# Patient Record
Sex: FEMALE | Race: BLACK OR AFRICAN AMERICAN | NOT HISPANIC OR LATINO | Employment: UNEMPLOYED | URBAN - METROPOLITAN AREA
[De-identification: names, ages, dates, MRNs, and addresses within clinical notes are randomized per-mention and may not be internally consistent; named-entity substitution may affect disease eponyms.]

---

## 2023-03-31 DIAGNOSIS — R73.9 HYPERGLYCEMIA: ICD-10-CM

## 2023-03-31 DIAGNOSIS — E55.9 VITAMIN D DEFICIENCY: ICD-10-CM

## 2023-03-31 DIAGNOSIS — E11.9 TYPE 2 DIABETES MELLITUS WITHOUT COMPLICATION, WITHOUT LONG-TERM CURRENT USE OF INSULIN (MULTI): ICD-10-CM

## 2023-03-31 DIAGNOSIS — I47.10 PAROXYSMAL SVT (SUPRAVENTRICULAR TACHYCARDIA) (CMS-HCC): ICD-10-CM

## 2023-03-31 DIAGNOSIS — Z00.00 ANNUAL PHYSICAL EXAM: ICD-10-CM

## 2023-03-31 DIAGNOSIS — E78.5 HYPERLIPIDEMIA, UNSPECIFIED HYPERLIPIDEMIA TYPE: ICD-10-CM

## 2023-03-31 DIAGNOSIS — I50.33 ACUTE ON CHRONIC DIASTOLIC CONGESTIVE HEART FAILURE (MULTI): ICD-10-CM

## 2023-03-31 RX ORDER — OMEPRAZOLE 40 MG/1
40 CAPSULE, DELAYED RELEASE ORAL 2 TIMES DAILY
COMMUNITY
End: 2023-04-05 | Stop reason: SDUPTHER

## 2023-03-31 RX ORDER — ATORVASTATIN CALCIUM 40 MG/1
40 TABLET, FILM COATED ORAL DAILY
COMMUNITY
End: 2023-04-05 | Stop reason: SDUPTHER

## 2023-03-31 RX ORDER — ACETAMINOPHEN 500 MG
50 TABLET ORAL DAILY
COMMUNITY
End: 2024-05-10 | Stop reason: SDUPTHER

## 2023-03-31 RX ORDER — METFORMIN HYDROCHLORIDE 500 MG/1
500 TABLET ORAL 2 TIMES DAILY
COMMUNITY
Start: 2021-11-12 | End: 2023-04-05 | Stop reason: SDUPTHER

## 2023-03-31 RX ORDER — BENZONATATE 200 MG/1
200 CAPSULE ORAL
COMMUNITY
Start: 2022-12-19 | End: 2023-04-05 | Stop reason: ALTCHOICE

## 2023-03-31 RX ORDER — FLUTICASONE PROPIONATE AND SALMETEROL 100; 50 UG/1; UG/1
1 POWDER RESPIRATORY (INHALATION)
COMMUNITY
End: 2023-04-05 | Stop reason: SDUPTHER

## 2023-03-31 RX ORDER — ALBUTEROL SULFATE 90 UG/1
2 AEROSOL, METERED RESPIRATORY (INHALATION) EVERY 6 HOURS
COMMUNITY
Start: 2022-04-05 | End: 2023-04-05 | Stop reason: SDUPTHER

## 2023-03-31 RX ORDER — METOPROLOL SUCCINATE 50 MG/1
50 TABLET, EXTENDED RELEASE ORAL DAILY
COMMUNITY
End: 2023-04-05 | Stop reason: SDUPTHER

## 2023-03-31 RX ORDER — PANTOPRAZOLE SODIUM 40 MG/1
40 TABLET, DELAYED RELEASE ORAL DAILY
COMMUNITY
End: 2023-04-05 | Stop reason: ALTCHOICE

## 2023-03-31 RX ORDER — FUROSEMIDE 20 MG/1
20 TABLET ORAL DAILY
COMMUNITY
Start: 2022-04-02 | End: 2023-06-22 | Stop reason: ALTCHOICE

## 2023-03-31 RX ORDER — MELOXICAM 7.5 MG/1
7.5 TABLET ORAL DAILY
COMMUNITY
End: 2024-01-11 | Stop reason: SDUPTHER

## 2023-03-31 RX ORDER — CLOPIDOGREL BISULFATE 75 MG/1
75 TABLET ORAL DAILY
COMMUNITY
End: 2023-04-05 | Stop reason: SDUPTHER

## 2023-04-05 ENCOUNTER — OFFICE VISIT (OUTPATIENT)
Dept: PRIMARY CARE | Facility: CLINIC | Age: 63
End: 2023-04-05
Payer: COMMERCIAL

## 2023-04-05 ENCOUNTER — APPOINTMENT (OUTPATIENT)
Dept: PRIMARY CARE | Facility: CLINIC | Age: 63
End: 2023-04-05
Payer: COMMERCIAL

## 2023-04-05 VITALS
HEART RATE: 88 BPM | OXYGEN SATURATION: 98 % | DIASTOLIC BLOOD PRESSURE: 88 MMHG | BODY MASS INDEX: 38.25 KG/M2 | SYSTOLIC BLOOD PRESSURE: 130 MMHG | WEIGHT: 238 LBS | RESPIRATION RATE: 12 BRPM | HEIGHT: 66 IN

## 2023-04-05 DIAGNOSIS — E11.69 TYPE 2 DIABETES MELLITUS WITH OTHER SPECIFIED COMPLICATION, UNSPECIFIED WHETHER LONG TERM INSULIN USE (MULTI): ICD-10-CM

## 2023-04-05 DIAGNOSIS — K21.9 GASTROESOPHAGEAL REFLUX DISEASE WITHOUT ESOPHAGITIS: ICD-10-CM

## 2023-04-05 DIAGNOSIS — R73.9 HYPERGLYCEMIA: ICD-10-CM

## 2023-04-05 DIAGNOSIS — Z12.31 ENCOUNTER FOR SCREENING MAMMOGRAM FOR BREAST CANCER: ICD-10-CM

## 2023-04-05 DIAGNOSIS — E55.9 VITAMIN D DEFICIENCY: ICD-10-CM

## 2023-04-05 DIAGNOSIS — J45.909 UNCOMPLICATED ASTHMA, UNSPECIFIED ASTHMA SEVERITY, UNSPECIFIED WHETHER PERSISTENT (HHS-HCC): Primary | ICD-10-CM

## 2023-04-05 DIAGNOSIS — I10 BENIGN ESSENTIAL HYPERTENSION: ICD-10-CM

## 2023-04-05 DIAGNOSIS — I50.33 ACUTE ON CHRONIC DIASTOLIC CONGESTIVE HEART FAILURE (MULTI): ICD-10-CM

## 2023-04-05 PROBLEM — M54.2 CERVICALGIA: Status: ACTIVE | Noted: 2023-04-05

## 2023-04-05 PROBLEM — F17.210 DEPENDENCE ON NICOTINE FROM CIGARETTES: Status: ACTIVE | Noted: 2023-04-05

## 2023-04-05 PROBLEM — N63.0 BREAST MASS: Status: ACTIVE | Noted: 2023-04-05

## 2023-04-05 PROBLEM — M25.569 JOINT PAIN, KNEE: Status: ACTIVE | Noted: 2023-04-05

## 2023-04-05 PROBLEM — M79.89 SWELLING OF RIGHT LOWER EXTREMITY: Status: ACTIVE | Noted: 2023-04-05

## 2023-04-05 PROBLEM — J01.10 ACUTE FRONTAL SINUSITIS: Status: ACTIVE | Noted: 2023-04-05

## 2023-04-05 PROBLEM — R06.09 DOE (DYSPNEA ON EXERTION): Status: ACTIVE | Noted: 2023-04-05

## 2023-04-05 PROBLEM — R50.9 FEVER: Status: ACTIVE | Noted: 2023-04-05

## 2023-04-05 PROBLEM — I47.10 PAROXYSMAL SVT (SUPRAVENTRICULAR TACHYCARDIA) (CMS-HCC): Status: ACTIVE | Noted: 2023-04-05

## 2023-04-05 PROBLEM — M25.562 LEFT KNEE PAIN: Status: ACTIVE | Noted: 2023-04-05

## 2023-04-05 PROBLEM — N64.59 ABNORMAL BREAST EXAM: Status: ACTIVE | Noted: 2023-04-05

## 2023-04-05 PROBLEM — G89.29 CHRONIC NECK PAIN: Status: ACTIVE | Noted: 2023-04-05

## 2023-04-05 PROBLEM — E11.9 DMII (DIABETES MELLITUS, TYPE 2) (MULTI): Status: ACTIVE | Noted: 2023-04-05

## 2023-04-05 PROBLEM — J20.9 ACUTE BRONCHITIS: Status: ACTIVE | Noted: 2023-04-05

## 2023-04-05 PROBLEM — R00.2 PALPITATIONS: Status: ACTIVE | Noted: 2023-04-05

## 2023-04-05 PROBLEM — U07.1 COVID-19: Status: ACTIVE | Noted: 2023-04-05

## 2023-04-05 PROBLEM — M54.2 CHRONIC NECK PAIN: Status: ACTIVE | Noted: 2023-04-05

## 2023-04-05 PROBLEM — E78.5 HYPERLIPIDEMIA: Status: ACTIVE | Noted: 2023-04-05

## 2023-04-05 PROBLEM — J30.9 ALLERGIC RHINITIS: Status: ACTIVE | Noted: 2023-04-05

## 2023-04-05 PROBLEM — R10.9 ABDOMINAL PAIN: Status: ACTIVE | Noted: 2023-04-05

## 2023-04-05 PROBLEM — G56.80 PINCHED NERVE IN SHOULDER: Status: ACTIVE | Noted: 2023-04-05

## 2023-04-05 PROBLEM — R05.9 COUGH: Status: ACTIVE | Noted: 2023-04-05

## 2023-04-05 PROBLEM — R06.2 WHEEZING: Status: ACTIVE | Noted: 2023-04-05

## 2023-04-05 PROBLEM — R09.89 CHEST CONGESTION: Status: ACTIVE | Noted: 2023-04-05

## 2023-04-05 PROBLEM — R07.89 ATYPICAL CHEST PAIN: Status: ACTIVE | Noted: 2023-04-05

## 2023-04-05 LAB — POC FINGERSTICK BLOOD GLUCOSE: 143 MG/DL (ref 70–100)

## 2023-04-05 PROCEDURE — 3079F DIAST BP 80-89 MM HG: CPT | Performed by: INTERNAL MEDICINE

## 2023-04-05 PROCEDURE — 99214 OFFICE O/P EST MOD 30 MIN: CPT | Performed by: INTERNAL MEDICINE

## 2023-04-05 PROCEDURE — 3075F SYST BP GE 130 - 139MM HG: CPT | Performed by: INTERNAL MEDICINE

## 2023-04-05 PROCEDURE — 82962 GLUCOSE BLOOD TEST: CPT | Performed by: INTERNAL MEDICINE

## 2023-04-05 RX ORDER — ATORVASTATIN CALCIUM 40 MG/1
40 TABLET, FILM COATED ORAL DAILY
Qty: 90 TABLET | Refills: 3 | Status: SHIPPED | OUTPATIENT
Start: 2023-04-05 | End: 2024-05-10 | Stop reason: SDUPTHER

## 2023-04-05 RX ORDER — METFORMIN HYDROCHLORIDE 500 MG/1
500 TABLET ORAL 2 TIMES DAILY
Qty: 180 TABLET | Refills: 3 | Status: SHIPPED | OUTPATIENT
Start: 2023-04-05 | End: 2024-05-10 | Stop reason: SDUPTHER

## 2023-04-05 RX ORDER — ALBUTEROL SULFATE 90 UG/1
2 AEROSOL, METERED RESPIRATORY (INHALATION) EVERY 6 HOURS
Qty: 18 G | Refills: 3 | Status: SHIPPED | OUTPATIENT
Start: 2023-04-05 | End: 2023-09-20 | Stop reason: SDUPTHER

## 2023-04-05 RX ORDER — METOPROLOL SUCCINATE 50 MG/1
50 TABLET, EXTENDED RELEASE ORAL DAILY
Qty: 90 TABLET | Refills: 3 | Status: SHIPPED | OUTPATIENT
Start: 2023-04-05 | End: 2024-05-10 | Stop reason: SDUPTHER

## 2023-04-05 RX ORDER — FLUTICASONE PROPIONATE AND SALMETEROL 100; 50 UG/1; UG/1
1 POWDER RESPIRATORY (INHALATION)
Qty: 60 EACH | Refills: 3 | Status: SHIPPED | OUTPATIENT
Start: 2023-04-05 | End: 2023-10-06 | Stop reason: SDUPTHER

## 2023-04-05 RX ORDER — CLOPIDOGREL BISULFATE 75 MG/1
75 TABLET ORAL DAILY
Qty: 90 TABLET | Refills: 3 | Status: SHIPPED | OUTPATIENT
Start: 2023-04-05 | End: 2024-05-10 | Stop reason: SDUPTHER

## 2023-04-05 RX ORDER — ALBUTEROL SULFATE 90 UG/1
2 AEROSOL, METERED RESPIRATORY (INHALATION) EVERY 6 HOURS PRN
COMMUNITY
Start: 2015-04-20 | End: 2024-05-10 | Stop reason: SDUPTHER

## 2023-04-05 RX ORDER — OMEPRAZOLE 40 MG/1
40 CAPSULE, DELAYED RELEASE ORAL 2 TIMES DAILY
Qty: 30 CAPSULE | Refills: 3 | Status: SHIPPED | OUTPATIENT
Start: 2023-04-05 | End: 2023-07-24 | Stop reason: SDUPTHER

## 2023-04-05 NOTE — PROGRESS NOTES
"Subjective   Chief complaint: Jaimie Joyce is a 62 y.o. female who presents for Med Refill (Pt is here for medication refills. No new concerns at this time. ).    HPI:  62 years old black female who came in for follow-up diabetes, hypertension, hyperlipidemia, COPD/asthma patient needs refill on her medication has been doing well denies any chest pain or shortness of breath at the present time.        Objective   /88 (BP Location: Left arm, Patient Position: Sitting, BP Cuff Size: Large adult)   Pulse 88   Resp 12   Ht 1.676 m (5' 6\")   Wt 108 kg (238 lb)   SpO2 98%   BMI 38.41 kg/m²   Physical Exam  Vitals reviewed.   Constitutional:       Appearance: Normal appearance.   HENT:      Head: Normocephalic and atraumatic.   Cardiovascular:      Rate and Rhythm: Normal rate and regular rhythm.   Pulmonary:      Effort: Pulmonary effort is normal.      Breath sounds: Normal breath sounds.   Abdominal:      General: Bowel sounds are normal.      Palpations: Abdomen is soft.   Musculoskeletal:      Cervical back: Neck supple.   Skin:     General: Skin is warm and dry.   Neurological:      General: No focal deficit present.      Mental Status: She is alert.   Psychiatric:         Mood and Affect: Mood normal.         Behavior: Behavior is cooperative.         I have reviewed and reconciled the medication list with the patient today.   Current Outpatient Medications:     albuterol 90 mcg/actuation inhaler, 2 puffs every 6 hours if needed., Disp: , Rfl:     cholecalciferol (Vitamin D-3) 50 mcg (2,000 unit) capsule, Take 1 capsule (50 mcg) by mouth early in the morning.., Disp: , Rfl:     meloxicam (Mobic) 7.5 mg tablet, Take 1 tablet (7.5 mg) by mouth once daily. Take with food., Disp: , Rfl:     albuterol 90 mcg/actuation inhaler, Inhale 2 puffs  in the morning and 2 puffs at noon and 2 puffs in the evening and 2 puffs before bedtime., Disp: 18 g, Rfl: 3    atorvastatin (Lipitor) 40 mg tablet, Take 1 " tablet (40 mg) by mouth once daily., Disp: 90 tablet, Rfl: 3    clopidogrel (Plavix) 75 mg tablet, Take 1 tablet (75 mg) by mouth once daily., Disp: 90 tablet, Rfl: 3    furosemide (Lasix) 20 mg tablet, Take 1 tablet (20 mg) by mouth once daily., Disp: , Rfl:     metFORMIN (Glucophage) 500 mg tablet, Take 1 tablet (500 mg) by mouth in the morning and 1 tablet (500 mg) before bedtime., Disp: 180 tablet, Rfl: 3    metoprolol succinate XL (Toprol-XL) 50 mg 24 hr tablet, Take 1 tablet (50 mg) by mouth once daily., Disp: 90 tablet, Rfl: 3    omeprazole (PriLOSEC) 40 mg DR capsule, Take 1 capsule (40 mg) by mouth in the morning and 1 capsule (40 mg) before bedtime., Disp: 30 capsule, Rfl: 3    Wixela Inhub 100-50 mcg/dose diskus inhaler, Inhale 1 puff  in the morning and 1 puff before bedtime., Disp: 60 each, Rfl: 3     Imaging:  No results found.     Labs reviewed:    Lab Results   Component Value Date    WBC 5.5 09/20/2022    HGB 12.6 09/20/2022    HCT 39.0 09/20/2022     09/20/2022    CHOL 194 05/27/2022    TRIG 162 (H) 05/27/2022    HDL 89.8 05/27/2022    ALT 16 09/20/2022    AST 18 09/20/2022     09/20/2022    K 4.1 09/20/2022     (H) 09/20/2022    CREATININE 0.90 09/20/2022    BUN 15 09/20/2022    CO2 24 09/20/2022    TSH 0.99 05/27/2022    INR 1.0 01/08/2021    HGBA1C 6.7 (A) 09/20/2022       Assessment/Plan   Problem List Items Addressed This Visit          Respiratory    Asthma - Primary     Albuterol inhaler and Wixela         Relevant Medications    Wixela Inhub 100-50 mcg/dose diskus inhaler       Circulatory    Acute on chronic diastolic congestive heart failure (CMS/HCC)     Lasix         Relevant Medications    metoprolol succinate XL (Toprol-XL) 50 mg 24 hr tablet    clopidogrel (Plavix) 75 mg tablet    Benign essential hypertension     Continue metoprolol blood pressure under         Relevant Medications    albuterol 90 mcg/actuation inhaler    omeprazole (PriLOSEC) 40 mg DR capsule     atorvastatin (Lipitor) 40 mg tablet    metoprolol succinate XL (Toprol-XL) 50 mg 24 hr tablet       Digestive    Acid reflux     PPI            Endocrine/Metabolic    DMII (diabetes mellitus, type 2) (CMS/HCC)     Refill metformin follow low-fat low-cholesterol low sugar diet         Relevant Medications    metFORMIN (Glucophage) 500 mg tablet    Other Relevant Orders    Referral to Podiatry    Referral to Ophthalmology    POCT fingerstick glucose manually resulted (Completed)    Vitamin D deficiency       Other    Hyperglycemia     Other Visit Diagnoses       Encounter for screening mammogram for breast cancer        Relevant Orders    BI mammo bilateral screening tomosynthesis            Continue current medications as listed  Follow up in 1 months for complete physical

## 2023-05-08 ENCOUNTER — OFFICE VISIT (OUTPATIENT)
Dept: PRIMARY CARE | Facility: CLINIC | Age: 63
End: 2023-05-08
Payer: COMMERCIAL

## 2023-05-08 VITALS
SYSTOLIC BLOOD PRESSURE: 130 MMHG | HEART RATE: 80 BPM | OXYGEN SATURATION: 98 % | HEIGHT: 66 IN | WEIGHT: 242 LBS | DIASTOLIC BLOOD PRESSURE: 74 MMHG | RESPIRATION RATE: 12 BRPM | BODY MASS INDEX: 38.89 KG/M2

## 2023-05-08 DIAGNOSIS — Z01.419 WELL WOMAN EXAM: ICD-10-CM

## 2023-05-08 DIAGNOSIS — Z79.4 TYPE 2 DIABETES MELLITUS WITH OTHER SPECIFIED COMPLICATION, WITH LONG-TERM CURRENT USE OF INSULIN (MULTI): ICD-10-CM

## 2023-05-08 DIAGNOSIS — E11.69 TYPE 2 DIABETES MELLITUS WITH OTHER SPECIFIED COMPLICATION, WITH LONG-TERM CURRENT USE OF INSULIN (MULTI): ICD-10-CM

## 2023-05-08 DIAGNOSIS — R25.2 MUSCLE CRAMP: Primary | ICD-10-CM

## 2023-05-08 LAB — POC FINGERSTICK BLOOD GLUCOSE: 100 MG/DL (ref 70–100)

## 2023-05-08 PROCEDURE — 99214 OFFICE O/P EST MOD 30 MIN: CPT | Performed by: INTERNAL MEDICINE

## 2023-05-08 PROCEDURE — 3075F SYST BP GE 130 - 139MM HG: CPT | Performed by: INTERNAL MEDICINE

## 2023-05-08 PROCEDURE — 82962 GLUCOSE BLOOD TEST: CPT | Performed by: INTERNAL MEDICINE

## 2023-05-08 PROCEDURE — 3078F DIAST BP <80 MM HG: CPT | Performed by: INTERNAL MEDICINE

## 2023-05-08 NOTE — PROGRESS NOTES
"Subjective   Chief complaint: Jaimie Joyce is a 62 y.o. female who presents for mammogram results (Pt is being seen today for mammogram results.  ).    HPI:  Patient presents to follow up on mammogram results. States she gets diffuse body cramping.        Objective   /74   Pulse 80   Resp 12   Ht 1.676 m (5' 6\")   Wt 110 kg (242 lb)   SpO2 98%   BMI 39.06 kg/m²   Physical Exam  Vitals reviewed.   Constitutional:       Appearance: Normal appearance.   HENT:      Head: Normocephalic and atraumatic.   Cardiovascular:      Rate and Rhythm: Normal rate and regular rhythm.   Pulmonary:      Effort: Pulmonary effort is normal.      Breath sounds: Normal breath sounds.   Abdominal:      General: Bowel sounds are normal.      Palpations: Abdomen is soft.   Musculoskeletal:      Cervical back: Neck supple.   Skin:     General: Skin is warm and dry.   Neurological:      General: No focal deficit present.      Mental Status: She is alert.   Psychiatric:         Mood and Affect: Mood normal.         Behavior: Behavior is cooperative.         I have reviewed and reconciled the medication list with the patient today.   Current Outpatient Medications:     albuterol 90 mcg/actuation inhaler, 2 puffs every 6 hours if needed., Disp: , Rfl:     albuterol 90 mcg/actuation inhaler, Inhale 2 puffs  in the morning and 2 puffs at noon and 2 puffs in the evening and 2 puffs before bedtime., Disp: 18 g, Rfl: 3    atorvastatin (Lipitor) 40 mg tablet, Take 1 tablet (40 mg) by mouth once daily., Disp: 90 tablet, Rfl: 3    cholecalciferol (Vitamin D-3) 50 mcg (2,000 unit) capsule, Take 1 capsule (50 mcg) by mouth early in the morning.., Disp: , Rfl:     clopidogrel (Plavix) 75 mg tablet, Take 1 tablet (75 mg) by mouth once daily., Disp: 90 tablet, Rfl: 3    furosemide (Lasix) 20 mg tablet, Take 1 tablet (20 mg) by mouth once daily., Disp: , Rfl:     meloxicam (Mobic) 7.5 mg tablet, Take 1 tablet (7.5 mg) by mouth once daily. " Take with food., Disp: , Rfl:     metFORMIN (Glucophage) 500 mg tablet, Take 1 tablet (500 mg) by mouth in the morning and 1 tablet (500 mg) before bedtime., Disp: 180 tablet, Rfl: 3    metoprolol succinate XL (Toprol-XL) 50 mg 24 hr tablet, Take 1 tablet (50 mg) by mouth once daily., Disp: 90 tablet, Rfl: 3    omeprazole (PriLOSEC) 40 mg DR capsule, Take 1 capsule (40 mg) by mouth in the morning and 1 capsule (40 mg) before bedtime., Disp: 30 capsule, Rfl: 3    Wixela Inhub 100-50 mcg/dose diskus inhaler, Inhale 1 puff  in the morning and 1 puff before bedtime., Disp: 60 each, Rfl: 3     Imaging:  BI mammo bilateral screening tomosynthesis    Result Date: 4/13/2023  Interpreted By:  RUBY ANTON MD and JOHNNIE JACQUES MD MRN: [id number] Patient Name: DESMOND NGUYỄN  STUDY: DIGITAL MAMM SCREENING W/ LEANNE;  4/12/2023 3:49 pm  ACCESSION NUMBER(S): [id number]  ORDERING CLINICIAN: JIMMY MOORE  INDICATION: Screening. Probably benign bilateral axillary lymph nodes and subareolar focal asymmetries visualized on 06/15/2021.  COMPARISON: Mammogram and ultrasound 06/15/2021  FINDINGS: 2D and tomosynthesis images were reviewed at 1 mm slice thickness.  The breast tissue is heterogeneously dense, which may obscure small masses.  The visualized bilateral axillary lymph nodes are unchanged compared to prior mammogram nearly 2 years prior, many of these demonstrate normal fatty dhara. The bilateral subareolar regions are normal. No suspicious masses or calcifications are identified.      No mammographic evidence of malignancy.  BI-RADS CATEGORY:  Category: 1 - Negative. Recommendation: 1 Year Screening.  For any future breast imaging appointments, please call [phone] ([phone]).  Patient letter sent SNORM  I personally reviewed the images/study and I agree with the findings as stated. This study was interpreted at [place], [place].       Labs reviewed:    Lab Results   Component  Value Date    WBC 5.5 09/20/2022    HGB 12.6 09/20/2022    HCT 39.0 09/20/2022     09/20/2022    CHOL 194 05/27/2022    TRIG 162 (H) 05/27/2022    HDL 89.8 05/27/2022    ALT 16 09/20/2022    AST 18 09/20/2022     09/20/2022    K 4.1 09/20/2022     (H) 09/20/2022    CREATININE 0.90 09/20/2022    BUN 15 09/20/2022    CO2 24 09/20/2022    TSH 0.99 05/27/2022    INR 1.0 01/08/2021    HGBA1C 6.7 (A) 09/20/2022       Assessment/Plan   Problem List Items Addressed This Visit          Musculoskeletal    Muscle cramp - Primary     Recommended to start magnesium oxide 400 mg            Endocrine/Metabolic    DMII (diabetes mellitus, type 2) (CMS/formerly [facility])    Relevant Orders    POCT fingerstick glucose manually resulted (Completed)     Other Visit Diagnoses       Well woman exam        Relevant Orders    Referral to Gynecology            Continue current medications as listed  Follow up in June for annual wellness visit.

## 2023-06-20 ENCOUNTER — CLINICAL SUPPORT (OUTPATIENT)
Dept: PRIMARY CARE | Facility: CLINIC | Age: 63
End: 2023-06-20
Payer: COMMERCIAL

## 2023-06-20 DIAGNOSIS — D50.9 IRON DEFICIENCY ANEMIA, UNSPECIFIED IRON DEFICIENCY ANEMIA TYPE: ICD-10-CM

## 2023-06-20 DIAGNOSIS — E78.5 HYPERLIPIDEMIA, UNSPECIFIED HYPERLIPIDEMIA TYPE: ICD-10-CM

## 2023-06-20 DIAGNOSIS — E11.69 TYPE 2 DIABETES MELLITUS WITH OTHER SPECIFIED COMPLICATION, WITH LONG-TERM CURRENT USE OF INSULIN (MULTI): ICD-10-CM

## 2023-06-20 DIAGNOSIS — E78.00 ELEVATED LDL CHOLESTEROL LEVEL: ICD-10-CM

## 2023-06-20 DIAGNOSIS — I10 BENIGN ESSENTIAL HYPERTENSION: ICD-10-CM

## 2023-06-20 DIAGNOSIS — Z79.4 TYPE 2 DIABETES MELLITUS WITH OTHER SPECIFIED COMPLICATION, WITH LONG-TERM CURRENT USE OF INSULIN (MULTI): ICD-10-CM

## 2023-06-20 DIAGNOSIS — Z00.00 ENCOUNTER FOR ANNUAL PHYSICAL EXAM: ICD-10-CM

## 2023-06-20 DIAGNOSIS — E55.9 VITAMIN D DEFICIENCY: ICD-10-CM

## 2023-06-20 DIAGNOSIS — I10 PRIMARY HYPERTENSION: ICD-10-CM

## 2023-06-20 DIAGNOSIS — E03.9 HYPOTHYROIDISM, UNSPECIFIED TYPE: ICD-10-CM

## 2023-06-20 LAB
ALANINE AMINOTRANSFERASE (SGPT) (U/L) IN SER/PLAS: 14 U/L (ref 7–45)
ALBUMIN (G/DL) IN SER/PLAS: 4.1 G/DL (ref 3.4–5)
ALKALINE PHOSPHATASE (U/L) IN SER/PLAS: 63 U/L (ref 33–136)
ANION GAP IN SER/PLAS: 15 MMOL/L (ref 10–20)
ASPARTATE AMINOTRANSFERASE (SGOT) (U/L) IN SER/PLAS: 16 U/L (ref 9–39)
BILIRUBIN TOTAL (MG/DL) IN SER/PLAS: 0.5 MG/DL (ref 0–1.2)
CALCIDIOL (25 OH VITAMIN D3) (NG/ML) IN SER/PLAS: 50 NG/ML
CALCIUM (MG/DL) IN SER/PLAS: 9.5 MG/DL (ref 8.6–10.6)
CARBON DIOXIDE, TOTAL (MMOL/L) IN SER/PLAS: 27 MMOL/L (ref 21–32)
CHLORIDE (MMOL/L) IN SER/PLAS: 105 MMOL/L (ref 98–107)
CHOLESTEROL (MG/DL) IN SER/PLAS: 144 MG/DL (ref 0–199)
CHOLESTEROL IN HDL (MG/DL) IN SER/PLAS: 77.6 MG/DL
CHOLESTEROL/HDL RATIO: 1.9
CREATININE (MG/DL) IN SER/PLAS: 0.84 MG/DL (ref 0.5–1.05)
ERYTHROCYTE DISTRIBUTION WIDTH (RATIO) BY AUTOMATED COUNT: 13.4 % (ref 11.5–14.5)
ERYTHROCYTE MEAN CORPUSCULAR HEMOGLOBIN CONCENTRATION (G/DL) BY AUTOMATED: 30.6 G/DL (ref 32–36)
ERYTHROCYTE MEAN CORPUSCULAR VOLUME (FL) BY AUTOMATED COUNT: 96 FL (ref 80–100)
ERYTHROCYTES (10*6/UL) IN BLOOD BY AUTOMATED COUNT: 3.88 X10E12/L (ref 4–5.2)
ESTIMATED AVERAGE GLUCOSE FOR HBA1C: 131 MG/DL
GFR FEMALE: 79 ML/MIN/1.73M2
GLUCOSE (MG/DL) IN SER/PLAS: 98 MG/DL (ref 74–99)
HEMATOCRIT (%) IN BLOOD BY AUTOMATED COUNT: 37.2 % (ref 36–46)
HEMOGLOBIN (G/DL) IN BLOOD: 11.4 G/DL (ref 12–16)
HEMOGLOBIN A1C/HEMOGLOBIN TOTAL IN BLOOD: 6.2 %
LDL: 45 MG/DL (ref 0–99)
LEUKOCYTES (10*3/UL) IN BLOOD BY AUTOMATED COUNT: 5 X10E9/L (ref 4.4–11.3)
NRBC (PER 100 WBCS) BY AUTOMATED COUNT: 0 /100 WBC (ref 0–0)
PLATELETS (10*3/UL) IN BLOOD AUTOMATED COUNT: 188 X10E9/L (ref 150–450)
POTASSIUM (MMOL/L) IN SER/PLAS: 4.1 MMOL/L (ref 3.5–5.3)
PROTEIN TOTAL: 6.6 G/DL (ref 6.4–8.2)
SODIUM (MMOL/L) IN SER/PLAS: 143 MMOL/L (ref 136–145)
THYROTROPIN (MIU/L) IN SER/PLAS BY DETECTION LIMIT <= 0.05 MIU/L: 0.85 MIU/L (ref 0.44–3.98)
TRIGLYCERIDE (MG/DL) IN SER/PLAS: 106 MG/DL (ref 0–149)
UREA NITROGEN (MG/DL) IN SER/PLAS: 17 MG/DL (ref 6–23)
VLDL: 21 MG/DL (ref 0–40)

## 2023-06-20 PROCEDURE — 84443 ASSAY THYROID STIM HORMONE: CPT

## 2023-06-20 PROCEDURE — 85027 COMPLETE CBC AUTOMATED: CPT

## 2023-06-20 PROCEDURE — 80061 LIPID PANEL: CPT

## 2023-06-20 PROCEDURE — 80053 COMPREHEN METABOLIC PANEL: CPT

## 2023-06-20 PROCEDURE — 83036 HEMOGLOBIN GLYCOSYLATED A1C: CPT

## 2023-06-20 PROCEDURE — 82306 VITAMIN D 25 HYDROXY: CPT

## 2023-06-20 NOTE — PROGRESS NOTES
Patient is here for physical blood work.  Patient was unable to do EKG at this time was on break from work for lab work

## 2023-06-22 ENCOUNTER — OFFICE VISIT (OUTPATIENT)
Dept: PRIMARY CARE | Facility: CLINIC | Age: 63
End: 2023-06-22
Payer: COMMERCIAL

## 2023-06-22 VITALS
SYSTOLIC BLOOD PRESSURE: 134 MMHG | BODY MASS INDEX: 37.45 KG/M2 | OXYGEN SATURATION: 100 % | WEIGHT: 233 LBS | HEART RATE: 73 BPM | DIASTOLIC BLOOD PRESSURE: 62 MMHG | HEIGHT: 66 IN | RESPIRATION RATE: 14 BRPM

## 2023-06-22 DIAGNOSIS — G62.9 NEUROPATHY: ICD-10-CM

## 2023-06-22 DIAGNOSIS — Z00.00 ANNUAL PHYSICAL EXAM: ICD-10-CM

## 2023-06-22 DIAGNOSIS — M67.432 GANGLION CYST OF DORSUM OF LEFT WRIST: ICD-10-CM

## 2023-06-22 DIAGNOSIS — E78.5 HYPERLIPIDEMIA, UNSPECIFIED HYPERLIPIDEMIA TYPE: ICD-10-CM

## 2023-06-22 DIAGNOSIS — M54.2 CERVICALGIA: Primary | ICD-10-CM

## 2023-06-22 DIAGNOSIS — I10 BENIGN ESSENTIAL HYPERTENSION: ICD-10-CM

## 2023-06-22 DIAGNOSIS — I50.33 ACUTE ON CHRONIC DIASTOLIC CONGESTIVE HEART FAILURE (MULTI): ICD-10-CM

## 2023-06-22 LAB
POC APPEARANCE, URINE: CLEAR
POC BILIRUBIN, URINE: NEGATIVE
POC BLOOD, URINE: NEGATIVE
POC COLOR, URINE: YELLOW
POC FECAL OCCULT BLOOD: NEGATIVE
POC GLUCOSE, URINE: NEGATIVE MG/DL
POC KETONES, URINE: NEGATIVE MG/DL
POC LEUKOCYTES, URINE: NEGATIVE
POC NITRITE,URINE: NEGATIVE
POC PH, URINE: 6 PH
POC PROTEIN, URINE: NEGATIVE MG/DL
POC SPECIFIC GRAVITY, URINE: 1.01
POC UROBILINOGEN, URINE: 0.2 EU/DL

## 2023-06-22 PROCEDURE — 99214 OFFICE O/P EST MOD 30 MIN: CPT | Performed by: INTERNAL MEDICINE

## 2023-06-22 PROCEDURE — 3075F SYST BP GE 130 - 139MM HG: CPT | Performed by: INTERNAL MEDICINE

## 2023-06-22 PROCEDURE — 3044F HG A1C LEVEL LT 7.0%: CPT | Performed by: INTERNAL MEDICINE

## 2023-06-22 PROCEDURE — 93000 ELECTROCARDIOGRAM COMPLETE: CPT | Performed by: INTERNAL MEDICINE

## 2023-06-22 PROCEDURE — 82270 OCCULT BLOOD FECES: CPT | Performed by: INTERNAL MEDICINE

## 2023-06-22 PROCEDURE — 81002 URINALYSIS NONAUTO W/O SCOPE: CPT | Performed by: INTERNAL MEDICINE

## 2023-06-22 PROCEDURE — 99396 PREV VISIT EST AGE 40-64: CPT | Performed by: INTERNAL MEDICINE

## 2023-06-22 PROCEDURE — 3078F DIAST BP <80 MM HG: CPT | Performed by: INTERNAL MEDICINE

## 2023-06-22 RX ORDER — DICLOFENAC SODIUM 10 MG/G
4 GEL TOPICAL 3 TIMES DAILY PRN
Qty: 100 G | Refills: 1 | Status: SHIPPED | OUTPATIENT
Start: 2023-06-22 | End: 2023-08-21

## 2023-06-22 ASSESSMENT — LIFESTYLE VARIABLES
HOW MANY STANDARD DRINKS CONTAINING ALCOHOL DO YOU HAVE ON A TYPICAL DAY: PATIENT DOES NOT DRINK
HOW OFTEN DO YOU HAVE A DRINK CONTAINING ALCOHOL: NEVER
HOW OFTEN DO YOU HAVE SIX OR MORE DRINKS ON ONE OCCASION: NEVER
SKIP TO QUESTIONS 9-10: 1
AUDIT-C TOTAL SCORE: 0

## 2023-06-22 ASSESSMENT — SOCIAL DETERMINANTS OF HEALTH (SDOH)

## 2023-06-22 ASSESSMENT — ENCOUNTER SYMPTOMS
OCCASIONAL FEELINGS OF UNSTEADINESS: 0
DEPRESSION: 0
LOSS OF SENSATION IN FEET: 0

## 2023-06-22 ASSESSMENT — PAIN SCALES - GENERAL: PAINLEVEL: 0-NO PAIN

## 2023-06-22 ASSESSMENT — PATIENT HEALTH QUESTIONNAIRE - PHQ9
1. LITTLE INTEREST OR PLEASURE IN DOING THINGS: NOT AT ALL
SUM OF ALL RESPONSES TO PHQ9 QUESTIONS 1 & 2: 0
2. FEELING DOWN, DEPRESSED OR HOPELESS: NOT AT ALL

## 2023-06-22 NOTE — PROGRESS NOTES
"Subjective   Chief complaint: Jaimie Joyce is a 62 y.o. female who presents for Annual Exam.    HPI:  Jaimie is here today for an annual exam. She reports random sweating and numbness in tingling down the bilateral hands.        Objective   /62 (BP Location: Left arm, Patient Position: Sitting, BP Cuff Size: Large adult)   Pulse 73   Resp 14   Ht 1.676 m (5' 6\")   Wt 106 kg (233 lb)   SpO2 100%   BMI 37.61 kg/m²   Physical Exam  Vitals reviewed.   Constitutional:       Appearance: Normal appearance.   HENT:      Head: Normocephalic and atraumatic.   Cardiovascular:      Rate and Rhythm: Normal rate and regular rhythm.   Pulmonary:      Effort: Pulmonary effort is normal.      Breath sounds: Normal breath sounds.   Abdominal:      General: Bowel sounds are normal.      Palpations: Abdomen is soft.   Musculoskeletal:      Cervical back: Neck supple.   Skin:     General: Skin is warm and dry.   Neurological:      General: No focal deficit present.      Mental Status: She is alert.   Psychiatric:         Mood and Affect: Mood normal.         Behavior: Behavior is cooperative.         I have reviewed and reconciled the medication list with the patient today.   Current Outpatient Medications:     albuterol 90 mcg/actuation inhaler, 2 puffs every 6 hours if needed., Disp: , Rfl:     atorvastatin (Lipitor) 40 mg tablet, Take 1 tablet (40 mg) by mouth once daily., Disp: 90 tablet, Rfl: 3    cholecalciferol (Vitamin D-3) 50 mcg (2,000 unit) capsule, Take 1 capsule (50 mcg) by mouth early in the morning.., Disp: , Rfl:     clopidogrel (Plavix) 75 mg tablet, Take 1 tablet (75 mg) by mouth once daily., Disp: 90 tablet, Rfl: 3    meloxicam (Mobic) 7.5 mg tablet, Take 1 tablet (7.5 mg) by mouth once daily. Take with food., Disp: , Rfl:     metFORMIN (Glucophage) 500 mg tablet, Take 1 tablet (500 mg) by mouth in the morning and 1 tablet (500 mg) before bedtime., Disp: 180 tablet, Rfl: 3    metoprolol succinate " XL (Toprol-XL) 50 mg 24 hr tablet, Take 1 tablet (50 mg) by mouth once daily., Disp: 90 tablet, Rfl: 3    omeprazole (PriLOSEC) 40 mg DR capsule, Take 1 capsule (40 mg) by mouth in the morning and 1 capsule (40 mg) before bedtime., Disp: 30 capsule, Rfl: 3    Wixela Inhub 100-50 mcg/dose diskus inhaler, Inhale 1 puff  in the morning and 1 puff before bedtime., Disp: 60 each, Rfl: 3    albuterol 90 mcg/actuation inhaler, Inhale 2 puffs  in the morning and 2 puffs at noon and 2 puffs in the evening and 2 puffs before bedtime., Disp: 18 g, Rfl: 3     Imaging:  No results found.     Labs reviewed:    Lab Results   Component Value Date    WBC 5.0 06/20/2023    HGB 11.4 (L) 06/20/2023    HCT 37.2 06/20/2023     06/20/2023    CHOL 144 06/20/2023    TRIG 106 06/20/2023    HDL 77.6 06/20/2023    ALT 14 06/20/2023    AST 16 06/20/2023     06/20/2023    K 4.1 06/20/2023     06/20/2023    CREATININE 0.84 06/20/2023    BUN 17 06/20/2023    CO2 27 06/20/2023    TSH 0.85 06/20/2023    INR 1.0 01/08/2021    HGBA1C 6.2 (A) 06/20/2023       Assessment/Plan   Problem List Items Addressed This Visit          Nervous    Cervicalgia - Primary     Patient is having increase numbness or tingling in hands         Neuropathy     Encouraged the use of wrist splinting at night  Wrist splint ordered            Circulatory    Acute on chronic diastolic congestive heart failure (CMS/HCC)    Relevant Orders    ECG 12 lead (Clinic Performed)    Benign essential hypertension     WNL today (134/62)            Musculoskeletal    Ganglion cyst of dorsum of left wrist     Encouraged use of voltaren gel to area 3x daily as needed            Other    Hyperlipidemia     WNL today          Other Visit Diagnoses       Annual physical exam        Relevant Orders    POCT UA (nonautomated) manually resulted (Completed)    POCT Fecal occult blood-guiac methodology screening manually resulted (Completed)            Continue current medications  as listed  Follow up in 3-4 months for blood work and hand numbness f/u

## 2023-07-24 DIAGNOSIS — I10 BENIGN ESSENTIAL HYPERTENSION: ICD-10-CM

## 2023-07-24 RX ORDER — OMEPRAZOLE 40 MG/1
40 CAPSULE, DELAYED RELEASE ORAL 2 TIMES DAILY
Qty: 90 CAPSULE | Refills: 3 | Status: SHIPPED | OUTPATIENT
Start: 2023-07-24 | End: 2024-05-10 | Stop reason: SDUPTHER

## 2023-09-20 PROBLEM — E66.9 OBESITY, CLASS II, BMI 35-39.9: Status: ACTIVE | Noted: 2019-09-27

## 2023-09-20 PROBLEM — J01.10 ACUTE FRONTAL SINUSITIS: Status: RESOLVED | Noted: 2023-04-05 | Resolved: 2023-09-20

## 2023-09-20 PROBLEM — E66.812 OBESITY, CLASS II, BMI 35-39.9: Status: ACTIVE | Noted: 2019-09-27

## 2023-09-20 PROBLEM — J20.9 ACUTE BRONCHITIS: Status: RESOLVED | Noted: 2023-04-05 | Resolved: 2023-09-20

## 2023-09-20 PROBLEM — S83.206A RIGHT KNEE MENISCAL TEAR: Status: ACTIVE | Noted: 2023-09-20

## 2023-09-28 ENCOUNTER — PATIENT OUTREACH (OUTPATIENT)
Dept: CARE COORDINATION | Facility: CLINIC | Age: 63
End: 2023-09-28
Payer: COMMERCIAL

## 2023-09-28 NOTE — PROGRESS NOTES
Several attempts to reach patient for [facility] for visit to ER. Left voicemail's.        OCT 2  2023 09:00 AM - Primary Care Nurse Visit  [facility] - [id number] RM 1      OCT 6  2023 01:00 PM - Primary Care Established   [facility] - Henry Lopes MD      MAY 22  2024 09:00 AM - Cardiology Established Patient Visit  [facility] - Judah WEEKS MD

## 2023-10-02 ENCOUNTER — CLINICAL SUPPORT (OUTPATIENT)
Dept: PRIMARY CARE | Facility: CLINIC | Age: 63
End: 2023-10-02
Payer: COMMERCIAL

## 2023-10-02 DIAGNOSIS — I10 BENIGN ESSENTIAL HYPERTENSION: ICD-10-CM

## 2023-10-02 DIAGNOSIS — D50.9 IRON DEFICIENCY ANEMIA, UNSPECIFIED IRON DEFICIENCY ANEMIA TYPE: ICD-10-CM

## 2023-10-02 DIAGNOSIS — E03.9 HYPOTHYROIDISM, UNSPECIFIED TYPE: ICD-10-CM

## 2023-10-02 DIAGNOSIS — E11.69 TYPE 2 DIABETES MELLITUS WITH OTHER SPECIFIED COMPLICATION, UNSPECIFIED WHETHER LONG TERM INSULIN USE (MULTI): ICD-10-CM

## 2023-10-02 LAB
ALBUMIN SERPL BCP-MCNC: 4.5 G/DL (ref 3.4–5)
ALP SERPL-CCNC: 63 U/L (ref 33–136)
ALT SERPL W P-5'-P-CCNC: 18 U/L (ref 7–45)
ANION GAP SERPL CALC-SCNC: 15 MMOL/L (ref 10–20)
AST SERPL W P-5'-P-CCNC: 15 U/L (ref 9–39)
BILIRUB SERPL-MCNC: 0.4 MG/DL (ref 0–1.2)
BUN SERPL-MCNC: 20 MG/DL (ref 6–23)
CALCIUM SERPL-MCNC: 9.6 MG/DL (ref 8.6–10.6)
CHLORIDE SERPL-SCNC: 105 MMOL/L (ref 98–107)
CO2 SERPL-SCNC: 26 MMOL/L (ref 21–32)
CREAT SERPL-MCNC: 0.95 MG/DL (ref 0.5–1.05)
ERYTHROCYTE [DISTWIDTH] IN BLOOD BY AUTOMATED COUNT: 13.6 % (ref 11.5–14.5)
EST. AVERAGE GLUCOSE BLD GHB EST-MCNC: 123 MG/DL
GFR SERPL CREATININE-BSD FRML MDRD: 68 ML/MIN/1.73M*2
GLUCOSE SERPL-MCNC: 98 MG/DL (ref 74–99)
HBA1C MFR BLD: 5.9 %
HCT VFR BLD AUTO: 39.4 % (ref 36–46)
HGB BLD-MCNC: 12.1 G/DL (ref 12–16)
MCH RBC QN AUTO: 29.7 PG (ref 26–34)
MCHC RBC AUTO-ENTMCNC: 30.7 G/DL (ref 32–36)
MCV RBC AUTO: 97 FL (ref 80–100)
NRBC BLD-RTO: 0 /100 WBCS (ref 0–0)
PLATELET # BLD AUTO: 174 X10*3/UL (ref 150–450)
PMV BLD AUTO: 11.4 FL (ref 7.5–11.5)
POTASSIUM SERPL-SCNC: 4.1 MMOL/L (ref 3.5–5.3)
PROT SERPL-MCNC: 7 G/DL (ref 6.4–8.2)
RBC # BLD AUTO: 4.08 X10*6/UL (ref 4–5.2)
SODIUM SERPL-SCNC: 142 MMOL/L (ref 136–145)
WBC # BLD AUTO: 4.9 X10*3/UL (ref 4.4–11.3)

## 2023-10-02 PROCEDURE — 36415 COLL VENOUS BLD VENIPUNCTURE: CPT

## 2023-10-06 ENCOUNTER — OFFICE VISIT (OUTPATIENT)
Dept: PRIMARY CARE | Facility: CLINIC | Age: 63
End: 2023-10-06
Payer: COMMERCIAL

## 2023-10-06 VITALS
WEIGHT: 235 LBS | BODY MASS INDEX: 37.93 KG/M2 | RESPIRATION RATE: 12 BRPM | SYSTOLIC BLOOD PRESSURE: 122 MMHG | HEART RATE: 66 BPM | DIASTOLIC BLOOD PRESSURE: 60 MMHG | OXYGEN SATURATION: 99 %

## 2023-10-06 DIAGNOSIS — R73.9 HYPERGLYCEMIA: ICD-10-CM

## 2023-10-06 DIAGNOSIS — E78.5 HYPERLIPIDEMIA, UNSPECIFIED HYPERLIPIDEMIA TYPE: Primary | ICD-10-CM

## 2023-10-06 DIAGNOSIS — J45.909 UNCOMPLICATED ASTHMA, UNSPECIFIED ASTHMA SEVERITY, UNSPECIFIED WHETHER PERSISTENT (HHS-HCC): ICD-10-CM

## 2023-10-06 LAB — POC FINGERSTICK BLOOD GLUCOSE: 99 MG/DL (ref 70–100)

## 2023-10-06 PROCEDURE — 3074F SYST BP LT 130 MM HG: CPT | Performed by: INTERNAL MEDICINE

## 2023-10-06 PROCEDURE — 3044F HG A1C LEVEL LT 7.0%: CPT | Performed by: INTERNAL MEDICINE

## 2023-10-06 PROCEDURE — 99214 OFFICE O/P EST MOD 30 MIN: CPT | Performed by: INTERNAL MEDICINE

## 2023-10-06 PROCEDURE — 82962 GLUCOSE BLOOD TEST: CPT | Performed by: INTERNAL MEDICINE

## 2023-10-06 PROCEDURE — 3078F DIAST BP <80 MM HG: CPT | Performed by: INTERNAL MEDICINE

## 2023-10-06 RX ORDER — FLUTICASONE PROPIONATE AND SALMETEROL 100; 50 UG/1; UG/1
1 POWDER RESPIRATORY (INHALATION)
Qty: 60 EACH | Refills: 3 | Status: SHIPPED | OUTPATIENT
Start: 2023-10-06 | End: 2024-05-02 | Stop reason: ALTCHOICE

## 2023-10-06 NOTE — ASSESSMENT & PLAN NOTE
Improving. See if insurance will cover for Jardiance and start medication. If not, continue with Metformin.

## 2023-10-06 NOTE — PROGRESS NOTES
Subjective   Chief complaint: Jaimie Joyce is a 62 y.o. female who presents for Follow-up (Pt is here for blood work follow up).    HPI:  HPI    Objective   /60   Pulse 66   Resp 12   Wt 107 kg (235 lb)   SpO2 99%   BMI 37.93 kg/m²   Physical Exam    I have reviewed and reconciled the medication list with the patient today.   Current Outpatient Medications:     albuterol 90 mcg/actuation inhaler, 2 puffs every 6 hours if needed., Disp: , Rfl:     atorvastatin (Lipitor) 40 mg tablet, Take 1 tablet (40 mg) by mouth once daily., Disp: 90 tablet, Rfl: 3    cholecalciferol (Vitamin D-3) 50 mcg (2,000 unit) capsule, Take 1 capsule (50 mcg) by mouth early in the morning.., Disp: , Rfl:     clopidogrel (Plavix) 75 mg tablet, Take 1 tablet (75 mg) by mouth once daily., Disp: 90 tablet, Rfl: 3    meloxicam (Mobic) 7.5 mg tablet, Take 1 tablet (7.5 mg) by mouth once daily. Take with food., Disp: , Rfl:     metFORMIN (Glucophage) 500 mg tablet, Take 1 tablet (500 mg) by mouth in the morning and 1 tablet (500 mg) before bedtime., Disp: 180 tablet, Rfl: 3    metoprolol succinate XL (Toprol-XL) 50 mg 24 hr tablet, Take 1 tablet (50 mg) by mouth once daily., Disp: 90 tablet, Rfl: 3    omeprazole (PriLOSEC) 40 mg DR capsule, Take 1 capsule (40 mg) by mouth 2 times a day., Disp: 90 capsule, Rfl: 3    Wixela Inhub 100-50 mcg/dose diskus inhaler, Inhale 1 puff  in the morning and 1 puff before bedtime., Disp: 60 each, Rfl: 3     Imaging:  XR chest 2 view    Result Date: 9/26/2023  Interpreted By:  GIANCARLO JONES MD MRN: [id number] Patient Name: JAIMIE JOYCE  STUDY: TH CHEST 2 VIEW PA AND LAT;  9/26/2023 12:02 pm  INDICATION: Chest pain and /or palpitations .  COMPARISON: Chest radiograph 04/03/2021  ACCESSION NUMBER(S): [id number]  ORDERING CLINICIAN: JACOB BALLARD  FINDINGS:   CARDIOMEDIASTINAL SILHOUETTE: Cardiomediastinal silhouette is unchanged.  LUNGS: No consolidation, pneumothorax, or effusion.   ABDOMEN: No remarkable upper abdominal findings.  BONES: No acute osseous changes.  Remaining findings appear stable since prior comparison radiograph.      1.  No evidence of acute cardiopulmonary process.         Labs reviewed:    Lab Results   Component Value Date    WBC 4.9 10/02/2023    HGB 12.1 10/02/2023    HCT 39.4 10/02/2023     10/02/2023    CHOL 144 06/20/2023    TRIG 106 06/20/2023    HDL 77.6 06/20/2023    ALT 18 10/02/2023    AST 15 10/02/2023     10/02/2023    K 4.1 10/02/2023     10/02/2023    CREATININE 0.95 10/02/2023    BUN 20 10/02/2023    CO2 26 10/02/2023    TSH 0.85 06/20/2023    INR 1.0 09/26/2023    HGBA1C 5.9 (H) 10/02/2023       Assessment/Plan   Problem List Items Addressed This Visit       Asthma     Continue use of Wixela         Hyperglycemia    Relevant Orders    POCT fingerstick glucose manually resulted (Completed)    Hyperlipidemia - Primary       Continue {Continue medications:87782} medications as listed  Follow up in ***

## 2023-10-06 NOTE — PROGRESS NOTES
Subjective   Chief complaint: Jaimie Joyce is a 62 y.o. female who presents for Follow-up (Pt is here for blood work follow up).    HPI:  Pt is here for blood work follow up.    Asthma  - Well controlled on Wixlea.    HLD  - Controlled on metformin. Needs repeat lipids.     DM  - Improving on Metformin. Last A1c was 5.9 (10/2/2023).         Objective   /60   Pulse 66   Resp 12   Wt 107 kg (235 lb)   SpO2 99%   BMI 37.93 kg/m²   Physical Exam  Vitals reviewed.   Constitutional:       Appearance: Normal appearance.   HENT:      Head: Normocephalic and atraumatic.   Cardiovascular:      Rate and Rhythm: Normal rate and regular rhythm.      Pulses: Normal pulses.      Heart sounds: Normal heart sounds.   Pulmonary:      Effort: Pulmonary effort is normal.      Breath sounds: Normal breath sounds.   Neurological:      Mental Status: She is alert and oriented to person, place, and time.   Psychiatric:         Mood and Affect: Mood normal.         I have reviewed and reconciled the medication list with the patient today.   Current Outpatient Medications:     albuterol 90 mcg/actuation inhaler, 2 puffs every 6 hours if needed., Disp: , Rfl:     atorvastatin (Lipitor) 40 mg tablet, Take 1 tablet (40 mg) by mouth once daily., Disp: 90 tablet, Rfl: 3    cholecalciferol (Vitamin D-3) 50 mcg (2,000 unit) capsule, Take 1 capsule (50 mcg) by mouth early in the morning.., Disp: , Rfl:     clopidogrel (Plavix) 75 mg tablet, Take 1 tablet (75 mg) by mouth once daily., Disp: 90 tablet, Rfl: 3    meloxicam (Mobic) 7.5 mg tablet, Take 1 tablet (7.5 mg) by mouth once daily. Take with food., Disp: , Rfl:     metFORMIN (Glucophage) 500 mg tablet, Take 1 tablet (500 mg) by mouth in the morning and 1 tablet (500 mg) before bedtime., Disp: 180 tablet, Rfl: 3    metoprolol succinate XL (Toprol-XL) 50 mg 24 hr tablet, Take 1 tablet (50 mg) by mouth once daily., Disp: 90 tablet, Rfl: 3    omeprazole (PriLOSEC) 40 mg   capsule, Take 1 capsule (40 mg) by mouth 2 times a day., Disp: 90 capsule, Rfl: 3    Wixela Inhub 100-50 mcg/dose diskus inhaler, Inhale 1 puff  in the morning and 1 puff before bedtime., Disp: 60 each, Rfl: 3     Imaging:  XR chest 2 view    Result Date: 9/26/2023  Interpreted By:  GIANCARLO JONES MD MRN: [id number] Patient Name: DESMOND NGUYỄN  STUDY:  CHEST 2 VIEW PA AND LAT;  9/26/2023 12:02 pm  INDICATION: Chest pain and /or palpitations .  COMPARISON: Chest radiograph 04/03/2021  ACCESSION NUMBER(S): [id number]  ORDERING CLINICIAN: JACOB BALLARD  FINDINGS:   CARDIOMEDIASTINAL SILHOUETTE: Cardiomediastinal silhouette is unchanged.  LUNGS: No consolidation, pneumothorax, or effusion.  ABDOMEN: No remarkable upper abdominal findings.  BONES: No acute osseous changes.  Remaining findings appear stable since prior comparison radiograph.      1.  No evidence of acute cardiopulmonary process.         Labs reviewed:    Lab Results   Component Value Date    WBC 4.9 10/02/2023    HGB 12.1 10/02/2023    HCT 39.4 10/02/2023     10/02/2023    CHOL 144 06/20/2023    TRIG 106 06/20/2023    HDL 77.6 06/20/2023    ALT 18 10/02/2023    AST 15 10/02/2023     10/02/2023    K 4.1 10/02/2023     10/02/2023    CREATININE 0.95 10/02/2023    BUN 20 10/02/2023    CO2 26 10/02/2023    TSH 0.85 06/20/2023    INR 1.0 09/26/2023    HGBA1C 5.9 (H) 10/02/2023       Assessment/Plan   Problem List Items Addressed This Visit       Asthma     Continue use of Wixela         Hyperglycemia     Improving. See if insurance will cover for Jardiance and start medication. If not, continue with Metformin.           Relevant Orders    POCT fingerstick glucose manually resulted (Completed)    Hyperlipidemia - Primary     Well controlled. Continue use of Statin. Will repeat lipids next visit.             Continue current medications as listed  Follow up in 3 mo.

## 2024-01-09 ENCOUNTER — CLINICAL SUPPORT (OUTPATIENT)
Dept: PRIMARY CARE | Facility: CLINIC | Age: 64
End: 2024-01-09
Payer: COMMERCIAL

## 2024-01-09 DIAGNOSIS — E78.5 HYPERLIPIDEMIA, UNSPECIFIED HYPERLIPIDEMIA TYPE: ICD-10-CM

## 2024-01-09 DIAGNOSIS — D50.9 IRON DEFICIENCY ANEMIA, UNSPECIFIED IRON DEFICIENCY ANEMIA TYPE: ICD-10-CM

## 2024-01-09 DIAGNOSIS — I10 PRIMARY HYPERTENSION: ICD-10-CM

## 2024-01-09 DIAGNOSIS — E03.9 HYPOTHYROIDISM, UNSPECIFIED TYPE: ICD-10-CM

## 2024-01-09 DIAGNOSIS — E78.00 ELEVATED LDL CHOLESTEROL LEVEL: ICD-10-CM

## 2024-01-09 DIAGNOSIS — I10 BENIGN ESSENTIAL HYPERTENSION: ICD-10-CM

## 2024-01-09 DIAGNOSIS — R73.9 HYPERGLYCEMIA: ICD-10-CM

## 2024-01-09 LAB
ALBUMIN SERPL BCP-MCNC: 4.5 G/DL (ref 3.4–5)
ALP SERPL-CCNC: 64 U/L (ref 33–136)
ALT SERPL W P-5'-P-CCNC: 21 U/L (ref 7–45)
ANION GAP SERPL CALC-SCNC: 13 MMOL/L (ref 10–20)
AST SERPL W P-5'-P-CCNC: 15 U/L (ref 9–39)
BILIRUB SERPL-MCNC: 0.5 MG/DL (ref 0–1.2)
BUN SERPL-MCNC: 17 MG/DL (ref 6–23)
CALCIUM SERPL-MCNC: 9.7 MG/DL (ref 8.6–10.6)
CHLORIDE SERPL-SCNC: 104 MMOL/L (ref 98–107)
CHOLEST SERPL-MCNC: 160 MG/DL (ref 0–199)
CHOLESTEROL/HDL RATIO: 1.8
CO2 SERPL-SCNC: 27 MMOL/L (ref 21–32)
CREAT SERPL-MCNC: 0.8 MG/DL (ref 0.5–1.05)
EGFRCR SERPLBLD CKD-EPI 2021: 83 ML/MIN/1.73M*2
GLUCOSE SERPL-MCNC: 111 MG/DL (ref 74–99)
HDLC SERPL-MCNC: 87.6 MG/DL
LDLC SERPL CALC-MCNC: 35 MG/DL
NON HDL CHOLESTEROL: 72 MG/DL (ref 0–149)
POTASSIUM SERPL-SCNC: 4.3 MMOL/L (ref 3.5–5.3)
PROT SERPL-MCNC: 7.1 G/DL (ref 6.4–8.2)
SODIUM SERPL-SCNC: 140 MMOL/L (ref 136–145)
TRIGL SERPL-MCNC: 187 MG/DL (ref 0–149)
VLDL: 37 MG/DL (ref 0–40)

## 2024-01-09 PROCEDURE — 83036 HEMOGLOBIN GLYCOSYLATED A1C: CPT

## 2024-01-09 PROCEDURE — 80061 LIPID PANEL: CPT

## 2024-01-09 PROCEDURE — 36415 COLL VENOUS BLD VENIPUNCTURE: CPT

## 2024-01-09 PROCEDURE — 80053 COMPREHEN METABOLIC PANEL: CPT

## 2024-01-10 LAB
EST. AVERAGE GLUCOSE BLD GHB EST-MCNC: 134 MG/DL
HBA1C MFR BLD: 6.3 %

## 2024-01-11 ENCOUNTER — OFFICE VISIT (OUTPATIENT)
Dept: PRIMARY CARE | Facility: CLINIC | Age: 64
End: 2024-01-11
Payer: COMMERCIAL

## 2024-01-11 VITALS
BODY MASS INDEX: 36.64 KG/M2 | SYSTOLIC BLOOD PRESSURE: 125 MMHG | OXYGEN SATURATION: 96 % | DIASTOLIC BLOOD PRESSURE: 76 MMHG | HEART RATE: 72 BPM | RESPIRATION RATE: 12 BRPM | WEIGHT: 227 LBS

## 2024-01-11 DIAGNOSIS — R25.2 MUSCLE CRAMP: ICD-10-CM

## 2024-01-11 DIAGNOSIS — E11.69 TYPE 2 DIABETES MELLITUS WITH OTHER SPECIFIED COMPLICATION, WITH LONG-TERM CURRENT USE OF INSULIN (MULTI): ICD-10-CM

## 2024-01-11 DIAGNOSIS — E78.5 HYPERLIPIDEMIA, UNSPECIFIED HYPERLIPIDEMIA TYPE: Primary | ICD-10-CM

## 2024-01-11 DIAGNOSIS — Z79.4 TYPE 2 DIABETES MELLITUS WITH OTHER SPECIFIED COMPLICATION, WITH LONG-TERM CURRENT USE OF INSULIN (MULTI): ICD-10-CM

## 2024-01-11 LAB — POC FINGERSTICK BLOOD GLUCOSE: 98 MG/DL (ref 70–100)

## 2024-01-11 PROCEDURE — 3078F DIAST BP <80 MM HG: CPT | Performed by: INTERNAL MEDICINE

## 2024-01-11 PROCEDURE — 82962 GLUCOSE BLOOD TEST: CPT | Performed by: INTERNAL MEDICINE

## 2024-01-11 PROCEDURE — 3074F SYST BP LT 130 MM HG: CPT | Performed by: INTERNAL MEDICINE

## 2024-01-11 PROCEDURE — 3044F HG A1C LEVEL LT 7.0%: CPT | Performed by: INTERNAL MEDICINE

## 2024-01-11 PROCEDURE — 3048F LDL-C <100 MG/DL: CPT | Performed by: INTERNAL MEDICINE

## 2024-01-11 PROCEDURE — 99214 OFFICE O/P EST MOD 30 MIN: CPT | Performed by: INTERNAL MEDICINE

## 2024-01-11 RX ORDER — MELOXICAM 7.5 MG/1
7.5 TABLET ORAL DAILY
Qty: 90 TABLET | Refills: 3 | Status: SHIPPED | OUTPATIENT
Start: 2024-01-11 | End: 2024-05-10 | Stop reason: SDUPTHER

## 2024-01-11 RX ORDER — FLAXSEED OIL 1000 MG
1 CAPSULE ORAL 3 TIMES DAILY
Qty: 90 CAPSULE | Refills: 2 | Status: SHIPPED | OUTPATIENT
Start: 2024-01-11 | End: 2024-05-10 | Stop reason: SDUPTHER

## 2024-01-11 NOTE — ASSESSMENT & PLAN NOTE
1/9/24 lipid panel - triglycerides 187. Discussed lifestyle and diet modifications with patient. Continue atorvastatin 40 mg daily. Start omega 3 fish oils and flax seed oil combined supplement 1200 mcg TID with meals.

## 2024-01-11 NOTE — PROGRESS NOTES
Subjective   Chief complaint: Jaimie Joyce is a 63 y.o. female who presents for Follow-up (Pt is here  for blood work follow up, c/o back pain ).    HPI:  Patient presenting to office to follow-up on blood work performed 1/9/24 - CMP, lipid panel, A1c.        Objective   /76   Pulse 72   Resp 12   Wt 103 kg (227 lb)   SpO2 96%   BMI 36.64 kg/m²   Physical Exam  Constitutional:       General: She is not in acute distress.     Appearance: Normal appearance.   HENT:      Head: Normocephalic and atraumatic.   Cardiovascular:      Rate and Rhythm: Normal rate and regular rhythm.      Pulses: Normal pulses.      Heart sounds: Normal heart sounds.   Pulmonary:      Effort: Pulmonary effort is normal.      Breath sounds: Normal breath sounds.   Skin:     General: Skin is warm and dry.   Neurological:      General: No focal deficit present.      Mental Status: She is alert and oriented to person, place, and time.         I have reviewed and reconciled the medication list with the patient today.   Current Outpatient Medications:     albuterol 90 mcg/actuation inhaler, 2 puffs every 6 hours if needed., Disp: , Rfl:     atorvastatin (Lipitor) 40 mg tablet, Take 1 tablet (40 mg) by mouth once daily., Disp: 90 tablet, Rfl: 3    cholecalciferol (Vitamin D-3) 50 mcg (2,000 unit) capsule, Take 1 capsule (50 mcg) by mouth early in the morning.., Disp: , Rfl:     clopidogrel (Plavix) 75 mg tablet, Take 1 tablet (75 mg) by mouth once daily., Disp: 90 tablet, Rfl: 3    meloxicam (Mobic) 7.5 mg tablet, Take 1 tablet (7.5 mg) by mouth once daily. Take with food., Disp: , Rfl:     metFORMIN (Glucophage) 500 mg tablet, Take 1 tablet (500 mg) by mouth in the morning and 1 tablet (500 mg) before bedtime., Disp: 180 tablet, Rfl: 3    metoprolol succinate XL (Toprol-XL) 50 mg 24 hr tablet, Take 1 tablet (50 mg) by mouth once daily., Disp: 90 tablet, Rfl: 3    omeprazole (PriLOSEC) 40 mg DR capsule, Take 1 capsule (40 mg) by  mouth 2 times a day., Disp: 90 capsule, Rfl: 3    Wixela Inhub 100-50 mcg/dose diskus inhaler, Inhale 1 puff 2 times a day., Disp: 60 each, Rfl: 3     Imaging:  No results found.     Labs reviewed:    Lab Results   Component Value Date    WBC 4.9 10/02/2023    HGB 12.1 10/02/2023    HCT 39.4 10/02/2023     10/02/2023    CHOL 160 01/09/2024    TRIG 187 (H) 01/09/2024    HDL 87.6 01/09/2024    ALT 21 01/09/2024    AST 15 01/09/2024     01/09/2024    K 4.3 01/09/2024     01/09/2024    CREATININE 0.80 01/09/2024    BUN 17 01/09/2024    CO2 27 01/09/2024    TSH 0.85 06/20/2023    INR 1.0 09/26/2023    HGBA1C 6.3 (H) 01/09/2024       Assessment/Plan   Problem List Items Addressed This Visit       DMII (diabetes mellitus, type 2) (CMS/[facility])     POC BG in-office 98. Glucose on 1/9/24 CMP - 111. Hemoglobin A1c on 1/9/24 - 6.3%, trending upwards. Discussed lifestyle and diet modifications with patient. Continue meds.         Relevant Orders    POCT fingerstick glucose manually resulted (Completed)    Hyperlipidemia - Primary     1/9/24 lipid panel - triglycerides 187. Discussed lifestyle and diet modifications with patient. Continue atorvastatin 40 mg daily. Start omega 3 fish oils and flax seed oil combined supplement 1200 mcg TID with meals.         Muscle cramp     Refill on meloxicam ordered.            Continue current medications as listed  Follow up in 3 months

## 2024-01-11 NOTE — ASSESSMENT & PLAN NOTE
POC BG in-office 98. Glucose on 1/9/24 CMP - 111. Hemoglobin A1c on 1/9/24 - 6.3%, trending upwards. Discussed lifestyle and diet modifications with patient. Continue meds.

## 2024-04-19 ENCOUNTER — APPOINTMENT (OUTPATIENT)
Dept: PRIMARY CARE | Facility: CLINIC | Age: 64
End: 2024-04-19
Payer: COMMERCIAL

## 2024-04-19 DIAGNOSIS — Z79.4 TYPE 2 DIABETES MELLITUS WITH OTHER SPECIFIED COMPLICATION, WITH LONG-TERM CURRENT USE OF INSULIN (MULTI): ICD-10-CM

## 2024-04-19 DIAGNOSIS — I10 PRIMARY HYPERTENSION: ICD-10-CM

## 2024-04-19 DIAGNOSIS — E03.9 HYPOTHYROIDISM, UNSPECIFIED TYPE: ICD-10-CM

## 2024-04-19 DIAGNOSIS — E78.00 ELEVATED LDL CHOLESTEROL LEVEL: ICD-10-CM

## 2024-04-19 DIAGNOSIS — E78.5 HYPERLIPIDEMIA, UNSPECIFIED HYPERLIPIDEMIA TYPE: ICD-10-CM

## 2024-04-19 DIAGNOSIS — D50.9 IRON DEFICIENCY ANEMIA, UNSPECIFIED IRON DEFICIENCY ANEMIA TYPE: ICD-10-CM

## 2024-04-19 DIAGNOSIS — E11.69 TYPE 2 DIABETES MELLITUS WITH OTHER SPECIFIED COMPLICATION, WITH LONG-TERM CURRENT USE OF INSULIN (MULTI): ICD-10-CM

## 2024-04-19 DIAGNOSIS — I10 BENIGN ESSENTIAL HYPERTENSION: ICD-10-CM

## 2024-04-19 LAB
ALBUMIN SERPL BCP-MCNC: 4.2 G/DL (ref 3.4–5)
ALP SERPL-CCNC: 56 U/L (ref 33–136)
ALT SERPL W P-5'-P-CCNC: 15 U/L (ref 7–45)
ANION GAP SERPL CALC-SCNC: 16 MMOL/L (ref 10–20)
AST SERPL W P-5'-P-CCNC: 13 U/L (ref 9–39)
BILIRUB SERPL-MCNC: 0.4 MG/DL (ref 0–1.2)
BUN SERPL-MCNC: 17 MG/DL (ref 6–23)
CALCIUM SERPL-MCNC: 9.3 MG/DL (ref 8.6–10.6)
CHLORIDE SERPL-SCNC: 105 MMOL/L (ref 98–107)
CHOLEST SERPL-MCNC: 201 MG/DL (ref 0–199)
CHOLESTEROL/HDL RATIO: 2.3
CO2 SERPL-SCNC: 25 MMOL/L (ref 21–32)
CREAT SERPL-MCNC: 0.81 MG/DL (ref 0.5–1.05)
EGFRCR SERPLBLD CKD-EPI 2021: 82 ML/MIN/1.73M*2
GLUCOSE SERPL-MCNC: 81 MG/DL (ref 74–99)
HDLC SERPL-MCNC: 88.5 MG/DL
LDLC SERPL CALC-MCNC: 87 MG/DL
NON HDL CHOLESTEROL: 113 MG/DL (ref 0–149)
POTASSIUM SERPL-SCNC: 4.2 MMOL/L (ref 3.5–5.3)
PROT SERPL-MCNC: 6.7 G/DL (ref 6.4–8.2)
SODIUM SERPL-SCNC: 142 MMOL/L (ref 136–145)
TRIGL SERPL-MCNC: 128 MG/DL (ref 0–149)
VLDL: 26 MG/DL (ref 0–40)

## 2024-04-19 PROCEDURE — 83036 HEMOGLOBIN GLYCOSYLATED A1C: CPT

## 2024-04-19 PROCEDURE — 36415 COLL VENOUS BLD VENIPUNCTURE: CPT

## 2024-04-19 PROCEDURE — 80053 COMPREHEN METABOLIC PANEL: CPT

## 2024-04-19 PROCEDURE — 80061 LIPID PANEL: CPT

## 2024-04-20 LAB
EST. AVERAGE GLUCOSE BLD GHB EST-MCNC: 128 MG/DL
HBA1C MFR BLD: 6.1 %

## 2024-04-26 ENCOUNTER — APPOINTMENT (OUTPATIENT)
Dept: PRIMARY CARE | Facility: CLINIC | Age: 64
End: 2024-04-26
Payer: COMMERCIAL

## 2024-05-02 ENCOUNTER — OFFICE VISIT (OUTPATIENT)
Dept: PRIMARY CARE | Facility: CLINIC | Age: 64
End: 2024-05-02
Payer: COMMERCIAL

## 2024-05-02 VITALS
HEART RATE: 74 BPM | OXYGEN SATURATION: 99 % | WEIGHT: 221 LBS | BODY MASS INDEX: 35.67 KG/M2 | SYSTOLIC BLOOD PRESSURE: 165 MMHG | DIASTOLIC BLOOD PRESSURE: 83 MMHG | RESPIRATION RATE: 12 BRPM

## 2024-05-02 DIAGNOSIS — F17.210 CIGARETTE NICOTINE DEPENDENCE WITHOUT COMPLICATION: Primary | ICD-10-CM

## 2024-05-02 DIAGNOSIS — I10 BENIGN ESSENTIAL HYPERTENSION: ICD-10-CM

## 2024-05-02 DIAGNOSIS — M19.90 ARTHRITIS: ICD-10-CM

## 2024-05-02 DIAGNOSIS — E55.9 VITAMIN D DEFICIENCY: ICD-10-CM

## 2024-05-02 DIAGNOSIS — E11.9 TYPE 2 DIABETES MELLITUS WITHOUT COMPLICATION, UNSPECIFIED WHETHER LONG TERM INSULIN USE (MULTI): ICD-10-CM

## 2024-05-02 DIAGNOSIS — E78.5 HYPERLIPIDEMIA, UNSPECIFIED HYPERLIPIDEMIA TYPE: ICD-10-CM

## 2024-05-02 DIAGNOSIS — J45.909 ASTHMA, UNSPECIFIED ASTHMA SEVERITY, UNSPECIFIED WHETHER COMPLICATED, UNSPECIFIED WHETHER PERSISTENT (HHS-HCC): ICD-10-CM

## 2024-05-02 DIAGNOSIS — M25.532 LEFT WRIST PAIN: ICD-10-CM

## 2024-05-02 LAB — POC FINGERSTICK BLOOD GLUCOSE: 122 MG/DL (ref 70–100)

## 2024-05-02 PROCEDURE — 3077F SYST BP >= 140 MM HG: CPT | Performed by: INTERNAL MEDICINE

## 2024-05-02 PROCEDURE — 3048F LDL-C <100 MG/DL: CPT | Performed by: INTERNAL MEDICINE

## 2024-05-02 PROCEDURE — 99214 OFFICE O/P EST MOD 30 MIN: CPT | Performed by: INTERNAL MEDICINE

## 2024-05-02 PROCEDURE — 82962 GLUCOSE BLOOD TEST: CPT | Performed by: INTERNAL MEDICINE

## 2024-05-02 PROCEDURE — 3079F DIAST BP 80-89 MM HG: CPT | Performed by: INTERNAL MEDICINE

## 2024-05-02 PROCEDURE — 3044F HG A1C LEVEL LT 7.0%: CPT | Performed by: INTERNAL MEDICINE

## 2024-05-02 RX ORDER — BUDESONIDE AND FORMOTEROL FUMARATE DIHYDRATE 160; 4.5 UG/1; UG/1
2 AEROSOL RESPIRATORY (INHALATION)
Qty: 10.2 G | Refills: 5 | Status: SHIPPED | OUTPATIENT
Start: 2024-05-02 | End: 2024-05-10 | Stop reason: SDUPTHER

## 2024-05-02 NOTE — PROGRESS NOTES
Subjective   Chief complaint: Jaimie Joyce is a 63 y.o. female who presents for Follow-up (Pt being seen for follow up on blood work. Pt c/o pain in left wrist.).    HPI:  HPI  Pt is here to get a letter so that he can return to his job, as he was advised to take some time off following stent placement.     Objective   /83   Pulse 74   Resp 12   Wt 100 kg (221 lb)   SpO2 99%   BMI 35.67 kg/m²   Physical Exam  Constitutional:       General: She is not in acute distress.  Eyes:      Extraocular Movements: Extraocular movements intact.   Cardiovascular:      Rate and Rhythm: Normal rate and regular rhythm.   Pulmonary:      Effort: Pulmonary effort is normal.      Breath sounds: Normal breath sounds.   Abdominal:      General: Bowel sounds are normal.      Palpations: Abdomen is soft.   Musculoskeletal:      Cervical back: Neck supple.      Right lower leg: No edema.      Left lower leg: No edema.   Neurological:      Mental Status: She is alert.   Psychiatric:         Mood and Affect: Mood normal.         Behavior: Behavior is cooperative.         I have reviewed and reconciled the medication list with the patient today.   Current Outpatient Medications:     albuterol 90 mcg/actuation inhaler, 2 puffs every 6 hours if needed., Disp: , Rfl:     atorvastatin (Lipitor) 40 mg tablet, Take 1 tablet (40 mg) by mouth once daily., Disp: 90 tablet, Rfl: 3    cholecalciferol (Vitamin D-3) 50 mcg (2,000 unit) capsule, Take 1 capsule (50 mcg) by mouth early in the morning.., Disp: , Rfl:     clopidogrel (Plavix) 75 mg tablet, Take 1 tablet (75 mg) by mouth once daily., Disp: 90 tablet, Rfl: 3    flaxseed oiL 1,000 mg capsule, Take 1 capsule (1,000 mg) by mouth 3 times a day., Disp: 90 capsule, Rfl: 2    meloxicam (Mobic) 7.5 mg tablet, Take 1 tablet (7.5 mg) by mouth once daily. Take with food., Disp: 90 tablet, Rfl: 3    metFORMIN (Glucophage) 500 mg tablet, Take 1 tablet (500 mg) by mouth in the morning and 1  tablet (500 mg) before bedtime., Disp: 180 tablet, Rfl: 3    metoprolol succinate XL (Toprol-XL) 50 mg 24 hr tablet, Take 1 tablet (50 mg) by mouth once daily., Disp: 90 tablet, Rfl: 3    omeprazole (PriLOSEC) 40 mg DR capsule, Take 1 capsule (40 mg) by mouth 2 times a day., Disp: 90 capsule, Rfl: 3     Imaging:  No results found.     Labs reviewed:    Lab Results   Component Value Date    WBC 4.9 10/02/2023    HGB 12.1 10/02/2023    HCT 39.4 10/02/2023     10/02/2023    CHOL 201 (H) 04/19/2024    TRIG 128 04/19/2024    HDL 88.5 04/19/2024    ALT 15 04/19/2024    AST 13 04/19/2024     04/19/2024    K 4.2 04/19/2024     04/19/2024    CREATININE 0.81 04/19/2024    BUN 17 04/19/2024    CO2 25 04/19/2024    TSH 0.85 06/20/2023    INR 1.0 09/26/2023    HGBA1C 6.1 (H) 04/19/2024       Assessment/Plan   Problem List Items Addressed This Visit       Asthma ([facility])    Benign essential hypertension     CONTROLLED. CONTINUE METOPROLOL         Dependence on nicotine from cigarettes - Primary     Counseled pt on diet   2 puffs of albuterol 2x/day as needed         DMII (diabetes mellitus, type 2) (Multi)     Stable, A1C 6.1  CONTINUE CURRENT MEDICATIONS          Hyperlipidemia     CONTROLLED. CONTINUE TAKING STATIN          Vitamin D deficiency     CONT. 50,000 international units  VIT D WEEKLY          Arthritis     XRAY ORDERED   REFILL MELOXICAM. TAKE 1 TABLET DAILY           Other Visit Diagnoses       Left wrist pain        Relevant Orders    XR wrist left 1-2 views            Continue current medications as listed  Follow up in 4 months

## 2024-05-03 ENCOUNTER — HOSPITAL ENCOUNTER (OUTPATIENT)
Dept: RADIOLOGY | Facility: HOSPITAL | Age: 64
Discharge: HOME | End: 2024-05-03
Payer: COMMERCIAL

## 2024-05-03 DIAGNOSIS — M25.532 LEFT WRIST PAIN: ICD-10-CM

## 2024-05-03 PROCEDURE — 73110 X-RAY EXAM OF WRIST: CPT | Mod: LEFT SIDE | Performed by: STUDENT IN AN ORGANIZED HEALTH CARE EDUCATION/TRAINING PROGRAM

## 2024-05-03 PROCEDURE — 73110 X-RAY EXAM OF WRIST: CPT | Mod: LT

## 2024-05-10 ENCOUNTER — OFFICE VISIT (OUTPATIENT)
Dept: PRIMARY CARE | Facility: CLINIC | Age: 64
End: 2024-05-10
Payer: COMMERCIAL

## 2024-05-10 VITALS
SYSTOLIC BLOOD PRESSURE: 158 MMHG | BODY MASS INDEX: 35.51 KG/M2 | OXYGEN SATURATION: 96 % | HEART RATE: 79 BPM | WEIGHT: 220 LBS | DIASTOLIC BLOOD PRESSURE: 84 MMHG | RESPIRATION RATE: 14 BRPM

## 2024-05-10 DIAGNOSIS — E55.9 VITAMIN D DEFICIENCY: ICD-10-CM

## 2024-05-10 DIAGNOSIS — I10 BENIGN ESSENTIAL HYPERTENSION: Primary | ICD-10-CM

## 2024-05-10 DIAGNOSIS — E78.5 HYPERLIPIDEMIA, UNSPECIFIED HYPERLIPIDEMIA TYPE: ICD-10-CM

## 2024-05-10 DIAGNOSIS — E11.69 TYPE 2 DIABETES MELLITUS WITH OTHER SPECIFIED COMPLICATION, UNSPECIFIED WHETHER LONG TERM INSULIN USE (MULTI): ICD-10-CM

## 2024-05-10 DIAGNOSIS — I10 BENIGN ESSENTIAL HYPERTENSION: ICD-10-CM

## 2024-05-10 DIAGNOSIS — R25.2 MUSCLE CRAMP: ICD-10-CM

## 2024-05-10 DIAGNOSIS — M19.90 ARTHRITIS: ICD-10-CM

## 2024-05-10 DIAGNOSIS — R06.09 DOE (DYSPNEA ON EXERTION): ICD-10-CM

## 2024-05-10 DIAGNOSIS — I50.33 ACUTE ON CHRONIC DIASTOLIC CONGESTIVE HEART FAILURE (MULTI): ICD-10-CM

## 2024-05-10 DIAGNOSIS — E11.9 TYPE 2 DIABETES MELLITUS WITHOUT COMPLICATION, UNSPECIFIED WHETHER LONG TERM INSULIN USE (MULTI): ICD-10-CM

## 2024-05-10 DIAGNOSIS — J45.909 ASTHMA, UNSPECIFIED ASTHMA SEVERITY, UNSPECIFIED WHETHER COMPLICATED, UNSPECIFIED WHETHER PERSISTENT (HHS-HCC): ICD-10-CM

## 2024-05-10 DIAGNOSIS — M25.569 ARTHRALGIA OF KNEE, UNSPECIFIED LATERALITY: ICD-10-CM

## 2024-05-10 LAB — POC FINGERSTICK BLOOD GLUCOSE: 110 MG/DL (ref 70–100)

## 2024-05-10 PROCEDURE — 3048F LDL-C <100 MG/DL: CPT | Performed by: INTERNAL MEDICINE

## 2024-05-10 PROCEDURE — 3044F HG A1C LEVEL LT 7.0%: CPT | Performed by: INTERNAL MEDICINE

## 2024-05-10 PROCEDURE — 3079F DIAST BP 80-89 MM HG: CPT | Performed by: INTERNAL MEDICINE

## 2024-05-10 PROCEDURE — 82962 GLUCOSE BLOOD TEST: CPT | Performed by: INTERNAL MEDICINE

## 2024-05-10 PROCEDURE — 3077F SYST BP >= 140 MM HG: CPT | Performed by: INTERNAL MEDICINE

## 2024-05-10 PROCEDURE — 99214 OFFICE O/P EST MOD 30 MIN: CPT | Performed by: INTERNAL MEDICINE

## 2024-05-10 RX ORDER — CLOPIDOGREL BISULFATE 75 MG/1
75 TABLET ORAL DAILY
Qty: 90 TABLET | Refills: 3 | Status: SHIPPED | OUTPATIENT
Start: 2024-05-10

## 2024-05-10 RX ORDER — METOPROLOL SUCCINATE 50 MG/1
50 TABLET, EXTENDED RELEASE ORAL DAILY
Qty: 90 TABLET | Refills: 3 | Status: SHIPPED | OUTPATIENT
Start: 2024-05-10

## 2024-05-10 RX ORDER — MELOXICAM 7.5 MG/1
7.5 TABLET ORAL 2 TIMES DAILY
Qty: 90 TABLET | Refills: 3 | Status: SHIPPED | OUTPATIENT
Start: 2024-05-10

## 2024-05-10 RX ORDER — METFORMIN HYDROCHLORIDE 500 MG/1
500 TABLET ORAL 2 TIMES DAILY
Qty: 180 TABLET | Refills: 3 | Status: SHIPPED | OUTPATIENT
Start: 2024-05-10

## 2024-05-10 RX ORDER — OMEPRAZOLE 40 MG/1
40 CAPSULE, DELAYED RELEASE ORAL 2 TIMES DAILY
Qty: 90 CAPSULE | Refills: 3 | Status: SHIPPED | OUTPATIENT
Start: 2024-05-10

## 2024-05-10 RX ORDER — ATORVASTATIN CALCIUM 40 MG/1
40 TABLET, FILM COATED ORAL DAILY
Qty: 90 TABLET | Refills: 3 | Status: SHIPPED | OUTPATIENT
Start: 2024-05-10

## 2024-05-10 RX ORDER — FLAXSEED OIL 1000 MG
1 CAPSULE ORAL 3 TIMES DAILY
Qty: 90 CAPSULE | Refills: 2 | Status: SHIPPED | OUTPATIENT
Start: 2024-05-10

## 2024-05-10 RX ORDER — DICLOFENAC SODIUM 10 MG/G
4 GEL TOPICAL 4 TIMES DAILY
Qty: 450 G | Refills: 11 | Status: SHIPPED | OUTPATIENT
Start: 2024-05-10

## 2024-05-10 RX ORDER — BUDESONIDE AND FORMOTEROL FUMARATE DIHYDRATE 160; 4.5 UG/1; UG/1
2 AEROSOL RESPIRATORY (INHALATION)
Qty: 10.2 G | Refills: 11 | Status: SHIPPED | OUTPATIENT
Start: 2024-05-10 | End: 2025-05-10

## 2024-05-10 RX ORDER — ACETAMINOPHEN 500 MG
2000 TABLET ORAL DAILY
Qty: 90 CAPSULE | Refills: 3 | Status: SHIPPED | OUTPATIENT
Start: 2024-05-10

## 2024-05-10 RX ORDER — ALBUTEROL SULFATE 90 UG/1
2 AEROSOL, METERED RESPIRATORY (INHALATION) EVERY 6 HOURS PRN
Qty: 18 G | Refills: 11 | Status: SHIPPED | OUTPATIENT
Start: 2024-05-10

## 2024-05-10 ASSESSMENT — ENCOUNTER SYMPTOMS
DEPRESSION: 0
LOSS OF SENSATION IN FEET: 0
OCCASIONAL FEELINGS OF UNSTEADINESS: 0

## 2024-05-10 ASSESSMENT — PAIN SCALES - GENERAL: PAINLEVEL: 8

## 2024-05-10 NOTE — PROGRESS NOTES
Subjective   Chief complaint: Jaimie Joyce is a 63 y.o. female who presents for Follow-up (Pt is being seen for follow up visit post x ray to left arm).    HPI:  HPI  64 yo female here to review her XRAY results for her left arm that was seen for last week. Reports pain that has not improved.   Objective   /84   Pulse 79   Resp 14   Wt 99.8 kg (220 lb)   SpO2 96%   BMI 35.51 kg/m²   Physical Exam  Constitutional:       General: She is not in acute distress.  Eyes:      Extraocular Movements: Extraocular movements intact.   Cardiovascular:      Rate and Rhythm: Normal rate and regular rhythm.   Pulmonary:      Effort: Pulmonary effort is normal.      Breath sounds: Normal breath sounds.   Abdominal:      General: Bowel sounds are normal.      Palpations: Abdomen is soft.   Musculoskeletal:      Cervical back: Neck supple.      Right lower leg: No edema.      Left lower leg: No edema.   Neurological:      Mental Status: She is alert.   Psychiatric:         Mood and Affect: Mood normal.         Behavior: Behavior is cooperative.         I have reviewed and reconciled the medication list with the patient today.   Current Outpatient Medications:     albuterol 90 mcg/actuation inhaler, 2 puffs every 6 hours if needed., Disp: , Rfl:     atorvastatin (Lipitor) 40 mg tablet, Take 1 tablet (40 mg) by mouth once daily., Disp: 90 tablet, Rfl: 3    budesonide-formoteroL (Symbicort) 160-4.5 mcg/actuation inhaler, Inhale 2 puffs 2 times a day. Rinse mouth with water after use to reduce aftertaste and incidence of candidiasis. Do not swallow., Disp: 10.2 g, Rfl: 5    cholecalciferol (Vitamin D-3) 50 mcg (2,000 unit) capsule, Take 1 capsule (50 mcg) by mouth early in the morning.., Disp: , Rfl:     clopidogrel (Plavix) 75 mg tablet, Take 1 tablet (75 mg) by mouth once daily., Disp: 90 tablet, Rfl: 3    flaxseed oiL 1,000 mg capsule, Take 1 capsule (1,000 mg) by mouth 3 times a day., Disp: 90 capsule, Rfl: 2     meloxicam (Mobic) 7.5 mg tablet, Take 1 tablet (7.5 mg) by mouth once daily. Take with food., Disp: 90 tablet, Rfl: 3    metFORMIN (Glucophage) 500 mg tablet, Take 1 tablet (500 mg) by mouth in the morning and 1 tablet (500 mg) before bedtime., Disp: 180 tablet, Rfl: 3    metoprolol succinate XL (Toprol-XL) 50 mg 24 hr tablet, Take 1 tablet (50 mg) by mouth once daily., Disp: 90 tablet, Rfl: 3    omeprazole (PriLOSEC) 40 mg DR capsule, Take 1 capsule (40 mg) by mouth 2 times a day., Disp: 90 capsule, Rfl: 3     Imaging:  XR wrist left 3+ views    Result Date: 5/8/2024  Interpreted By:  Lindy Barnes, STUDY: XR WRIST LEFT 3+ VIEWS; ;  5/3/2024 2:47 pm   INDICATION: Signs/Symptoms:left wrist pain.   COMPARISON: None.   ACCESSION NUMBER(S): [id number]   ORDERING CLINICIAN: JIMMY MOORE   FINDINGS: Three views of the left hand were performed.   No acute fracture or dislocation. There are mild degenerative changes in the 1st digit carpometacarpal joint with joint space narrowing and small marginal osteophytes. There are also mild degenerative changes in the radiocarpal joint with small marginal osteophytes. No obvious soft tissue abnormality is noted.       1. No acute fracture or dislocation. 2. Mild arthrosis in the radiocarpal and 1st digit carpometacarpal joints as described above.       MACRO: None   Signed by: Lindy Barnes 5/8/2024 9:50 AM Dictation workstation:   [id number]       Labs reviewed:    Lab Results   Component Value Date    WBC 4.9 10/02/2023    HGB 12.1 10/02/2023    HCT 39.4 10/02/2023     10/02/2023    CHOL 201 (H) 04/19/2024    TRIG 128 04/19/2024    HDL 88.5 04/19/2024    ALT 15 04/19/2024    AST 13 04/19/2024     04/19/2024    K 4.2 04/19/2024     04/19/2024    CREATININE 0.81 04/19/2024    BUN 17 04/19/2024    CO2 25 04/19/2024    TSH 0.85 06/20/2023    INR 1.0 09/26/2023    HGBA1C 6.1 (H) 04/19/2024       Assessment/Plan   Problem List Items Addressed This Visit        Benign essential hypertension - Primary     Elevated. Pt has not been taking medication  Refill metoprolol          DMII (diabetes mellitus, type 2) (Multi)    Relevant Orders    POCT Fingerstick Glucose manually resulted (Completed)    MARSH (dyspnea on exertion)     Change inhaler to ProAir          Hyperlipidemia     Refill 40 mg Lipitor. Take once a day          Arthritis     Imaging reviewed with patient. Mild degenerative arthritis noted.  Take 7.5 mg of Mobic to help with pain 2x/day  Apply OTC Voltaren gel 4x/day             Continue current medications as listed  Follow up in 3 months

## 2024-05-10 NOTE — ASSESSMENT & PLAN NOTE
Imaging reviewed with patient. Mild degenerative arthritis noted.  Take 7.5 mg of Mobic to help with pain 2x/day  Apply OTC Voltaren gel 4x/day

## 2024-05-22 ENCOUNTER — OFFICE VISIT (OUTPATIENT)
Dept: CARDIOLOGY | Facility: CLINIC | Age: 64
End: 2024-05-22
Payer: COMMERCIAL

## 2024-05-22 VITALS
SYSTOLIC BLOOD PRESSURE: 132 MMHG | HEART RATE: 79 BPM | BODY MASS INDEX: 34.72 KG/M2 | HEIGHT: 66 IN | WEIGHT: 216 LBS | OXYGEN SATURATION: 100 % | DIASTOLIC BLOOD PRESSURE: 78 MMHG

## 2024-05-22 DIAGNOSIS — I10 BENIGN ESSENTIAL HYPERTENSION: ICD-10-CM

## 2024-05-22 DIAGNOSIS — E11.9 TYPE 2 DIABETES MELLITUS WITHOUT COMPLICATION, UNSPECIFIED WHETHER LONG TERM INSULIN USE (MULTI): ICD-10-CM

## 2024-05-22 DIAGNOSIS — I50.33 ACUTE ON CHRONIC DIASTOLIC CONGESTIVE HEART FAILURE (MULTI): Primary | ICD-10-CM

## 2024-05-22 DIAGNOSIS — R00.2 PALPITATIONS: ICD-10-CM

## 2024-05-22 DIAGNOSIS — I47.10 PAROXYSMAL SVT (SUPRAVENTRICULAR TACHYCARDIA) (CMS-HCC): ICD-10-CM

## 2024-05-22 DIAGNOSIS — R06.09 DOE (DYSPNEA ON EXERTION): ICD-10-CM

## 2024-05-22 PROCEDURE — 3044F HG A1C LEVEL LT 7.0%: CPT | Performed by: STUDENT IN AN ORGANIZED HEALTH CARE EDUCATION/TRAINING PROGRAM

## 2024-05-22 PROCEDURE — 3078F DIAST BP <80 MM HG: CPT | Performed by: STUDENT IN AN ORGANIZED HEALTH CARE EDUCATION/TRAINING PROGRAM

## 2024-05-22 PROCEDURE — 3075F SYST BP GE 130 - 139MM HG: CPT | Performed by: STUDENT IN AN ORGANIZED HEALTH CARE EDUCATION/TRAINING PROGRAM

## 2024-05-22 PROCEDURE — 99214 OFFICE O/P EST MOD 30 MIN: CPT | Performed by: STUDENT IN AN ORGANIZED HEALTH CARE EDUCATION/TRAINING PROGRAM

## 2024-05-22 PROCEDURE — 3048F LDL-C <100 MG/DL: CPT | Performed by: STUDENT IN AN ORGANIZED HEALTH CARE EDUCATION/TRAINING PROGRAM

## 2024-05-22 ASSESSMENT — ENCOUNTER SYMPTOMS: OCCASIONAL FEELINGS OF UNSTEADINESS: 0

## 2024-05-22 NOTE — PROGRESS NOTES
Follow-up     HPI:    Jaimie Joyce is a 63 y.o. female with pertinent history of asthma, prior smoking history, family history of premature CAD, hyperlipidemia, relatively short episodes of paroxysmal supraventricular tachycardia on event monitor performed July 2020, hospitalization for palpitations and dizziness with troponin elevation of 0.27 on 8/20/2020, mild nonobstructive coronary artery disease of the left system with incomplete evaluation of the RCA, elevated LVEDP of 21 to 23 mmHg consistent with acute on chronic diastolic heart failure cardiac catheterization performed 10/8/2020, preserved ejection fraction echo performed 5/10/2023 presents to cardiology clinic for follow up.    She is doing relatively well.  No significant chest pain.  She does note some episodes of anxiety.  Dyspnea on exertion is stable.  She does note that she ran out of her medications for a while due to insurance changes at her job.  More recently, she has been compliant with her medications.  Blood pressure has been well-controlled.  Reviewed discussed her prior echocardiogram and catheterization.  No exacerbating or relieving factors.  Patient denies chest pain and angina.  Pt denies orthopnea, and paroxysmal nocturnal dyspnea.  Pt denies worsening lower extremity edema.  Pt denies palpitations or syncope.  No recent falls.  No fever or chills.  No cough.  No change in bowel or bladder habits.  No sick contacts.  No recent travel.    12 point review of systems including (Constitutional, Eyes, ENMT, Respiratory, Cardiac, Gastrointestinal, Neurological, Psychiatric, and Hematologic) was performed and is otherwise negative.    Past medical history reviewed:   has a past medical history of Encounter for general adult medical examination without abnormal findings (11/01/2021) and Other specified postprocedural states.    Past surgical history reviewed:   has a past surgical history that includes Tubal ligation (04/16/2015);  Other surgical history (08/28/2020); and Other surgical history (08/28/2020).    Social history reviewed:   reports that she has been smoking cigarettes. She does not have any smokeless tobacco history on file. She reports current alcohol use. She reports that she does not use drugs.     Family history reviewed:    Family History   Problem Relation Name Age of Onset    Cancer Mother      Cancer Father         Allergies reviewed: Aspirin, Latex, Penicillins, and Salicylates     Medications reviewed:   Current Outpatient Medications   Medication Instructions    albuterol 90 mcg/actuation inhaler 2 puffs, inhalation, Every 6 hours PRN    atorvastatin (LIPITOR) 40 mg, oral, Daily    budesonide-formoteroL (Symbicort) 160-4.5 mcg/actuation inhaler 2 puffs, inhalation, 2 times daily RT, Rinse mouth with water after use to reduce aftertaste and incidence of candidiasis. Do not swallow.    cholecalciferol (VITAMIN D-3) 50 mcg, oral, Daily    clopidogrel (PLAVIX) 75 mg, oral, Daily    diclofenac sodium (VOLTAREN) 4 g, Topical, 4 times daily    flaxseed oiL 1,000 mg, oral, 3 times daily    meloxicam (MOBIC) 7.5 mg, oral, 2 times daily, Take with food.    metFORMIN (GLUCOPHAGE) 500 mg, oral, 2 times daily    metoprolol succinate XL (TOPROL-XL) 50 mg, oral, Daily    omeprazole (PRILOSEC) 40 mg, oral, 2 times daily        Vitals reviewed: Visit Vitals  /78   Pulse 79       Physical Exam:   General:  Patient is awake, alert, and oriented.  Patient is in no acute distress.  HEENT:  Pupils equal and reactive.  Normocephalic.  Moist mucosa.    Neck:  No thyromegaly.  Normal Jugular Venous Pressure.  Cardiovascular:  Regular rate and rhythm.  Normal S1 and S2.  1/6 KEVIN.  Pulmonary:  Clear to auscultation bilaterally.  Abdomen:  Soft. Non-tender.   Non-distended.  Positive bowel sounds.  Lower Extremities:  2+ pedal pulses. No LE edema.  Neurologic:  Cranial nerves intact.  No focal deficit.   Skin: Skin warm and dry, normal skin  turgor.   Psychiatric: Normal affect.    Last Labs:  CBC -      Lab Results   Component Value Date    WBC 4.9 10/02/2023    HGB 12.1 10/02/2023    HCT 39.4 10/02/2023     10/02/2023        CMP-  Lab Results   Component Value Date    GLUCOSE 81 04/19/2024     04/19/2024    K 4.2 04/19/2024     04/19/2024    CO2 25 04/19/2024    ANIONGAP 16 04/19/2024    BUN 17 04/19/2024    CREATININE 0.81 04/19/2024    EGFR 82 04/19/2024    CALCIUM 9.3 04/19/2024    PHOS 4.2 08/21/2020    PROT 6.7 04/19/2024    ALBUMIN 4.2 04/19/2024    AST 13 04/19/2024    ALT 15 04/19/2024    ALKPHOS 56 04/19/2024    BILITOT 0.4 04/19/2024        LIPIDS-  Lab Results   Component Value Date    CHOL 201 (H) 04/19/2024    TRIG 128 04/19/2024    HDL 88.5 04/19/2024    CHHDL 2.3 04/19/2024    VLDL 26 04/19/2024        OTHERS-  Lab Results   Component Value Date    HGBA1C 6.1 (H) 04/19/2024    BNP 77 09/26/2023        I personally reviewed the patient's recent vitals, labs, medications, orders, EKGs, pertinent cardiac imaging/ echocardiography and ischemic evaluations including stress testing/ cardiac catheterization.    Assessment and Plan:    Jaimie Joyce is a 63 y.o. female with pertinent history of asthma, prior smoking history, family history of premature CAD, hyperlipidemia, relatively short episodes of paroxysmal supraventricular tachycardia on event monitor performed July 2020, hospitalization for palpitations and dizziness with troponin elevation of 0.27 on 8/20/2020, mild nonobstructive coronary artery disease of the left system with incomplete evaluation of the RCA, elevated LVEDP of 21 to 23 mmHg consistent with acute on chronic diastolic heart failure cardiac catheterization performed 10/8/2020, preserved ejection fraction echo performed 5/10/2023 presents to cardiology clinic for follow up.  She is doing relatively well.  No significant chest pain.  She does note some episodes of anxiety.  Dyspnea on exertion is  stable.  She does note that she ran out of her medications for a while due to insurance changes at her job.  More recently, she has been compliant with her medications.  Blood pressure has been well-controlled.  Reviewed discussed her prior echocardiogram and catheterization.     Please continue current cardiac medications including atorvastatin 40 mg daily, Plavix 75 mg daily, metoprolol succinate 50 mg daily.    Please followup with me in Cardiology clinic within the next 1 year.  Please return to clinic sooner or seek emergent care if your symptoms reoccur or worsen.    Thank you for allowing me to participate in their care.  Please feel free to call me with any further questions or concerns.        Judah Sharif MD, FACC, [facility]  Division of Cardiovascular Medicine  System Director, Nuclear Cardiology   Medical Director, [facility] [place], [facility]   Clinical , [employer] [facility] [employer]  [email or web address]   Office:  [phone]

## 2024-05-22 NOTE — PATIENT INSTRUCTIONS
Please continue current cardiac medications including atorvastatin 40 mg daily, Plavix 75 mg daily, metoprolol succinate 50 mg daily.    Please followup with me in Cardiology clinic within the next 1 year.  Please return to clinic sooner or seek emergent care if your symptoms reoccur or worsen.

## 2024-08-15 ENCOUNTER — APPOINTMENT (OUTPATIENT)
Dept: PRIMARY CARE | Facility: CLINIC | Age: 64
End: 2024-08-15
Payer: COMMERCIAL

## 2024-08-23 ENCOUNTER — APPOINTMENT (OUTPATIENT)
Dept: PRIMARY CARE | Facility: CLINIC | Age: 64
End: 2024-08-23
Payer: COMMERCIAL

## 2024-09-27 ENCOUNTER — APPOINTMENT (OUTPATIENT)
Dept: PRIMARY CARE | Facility: CLINIC | Age: 64
End: 2024-09-27
Payer: COMMERCIAL

## 2025-04-23 ENCOUNTER — APPOINTMENT (OUTPATIENT)
Dept: CARDIOLOGY | Facility: HOSPITAL | Age: 65
End: 2025-04-23

## 2025-04-23 ENCOUNTER — APPOINTMENT (OUTPATIENT)
Dept: RADIOLOGY | Facility: HOSPITAL | Age: 65
End: 2025-04-23

## 2025-04-23 ENCOUNTER — HOSPITAL ENCOUNTER (EMERGENCY)
Facility: HOSPITAL | Age: 65
Discharge: HOME | End: 2025-04-23
Attending: EMERGENCY MEDICINE

## 2025-04-23 VITALS
OXYGEN SATURATION: 99 % | HEART RATE: 68 BPM | DIASTOLIC BLOOD PRESSURE: 61 MMHG | WEIGHT: 189 LBS | SYSTOLIC BLOOD PRESSURE: 145 MMHG | HEIGHT: 66 IN | RESPIRATION RATE: 18 BRPM | TEMPERATURE: 97.1 F | BODY MASS INDEX: 30.37 KG/M2

## 2025-04-23 DIAGNOSIS — J40 BRONCHITIS: Primary | ICD-10-CM

## 2025-04-23 LAB
ALBUMIN SERPL BCP-MCNC: 4.2 G/DL (ref 3.4–5)
ALP SERPL-CCNC: 56 U/L (ref 33–136)
ALT SERPL W P-5'-P-CCNC: 19 U/L (ref 7–45)
ANION GAP SERPL CALC-SCNC: 12 MMOL/L (ref 10–20)
AST SERPL W P-5'-P-CCNC: 19 U/L (ref 9–39)
ATRIAL RATE: 69 BPM
BASOPHILS # BLD AUTO: 0.02 X10*3/UL (ref 0–0.1)
BASOPHILS NFR BLD AUTO: 0.3 %
BILIRUB SERPL-MCNC: 0.4 MG/DL (ref 0–1.2)
BNP SERPL-MCNC: 201 PG/ML (ref 0–99)
BUN SERPL-MCNC: 18 MG/DL (ref 6–23)
CALCIUM SERPL-MCNC: 9.3 MG/DL (ref 8.6–10.3)
CARDIAC TROPONIN I PNL SERPL HS: <3 NG/L (ref 0–13)
CHLORIDE SERPL-SCNC: 105 MMOL/L (ref 98–107)
CO2 SERPL-SCNC: 27 MMOL/L (ref 21–32)
CREAT SERPL-MCNC: 0.75 MG/DL (ref 0.5–1.05)
EGFRCR SERPLBLD CKD-EPI 2021: 90 ML/MIN/1.73M*2
EOSINOPHIL # BLD AUTO: 0.26 X10*3/UL (ref 0–0.7)
EOSINOPHIL NFR BLD AUTO: 3.4 %
ERYTHROCYTE [DISTWIDTH] IN BLOOD BY AUTOMATED COUNT: 13.2 % (ref 11.5–14.5)
FLUAV RNA RESP QL NAA+PROBE: NOT DETECTED
FLUBV RNA RESP QL NAA+PROBE: NOT DETECTED
GLUCOSE SERPL-MCNC: 102 MG/DL (ref 74–99)
HCT VFR BLD AUTO: 38.3 % (ref 36–46)
HGB BLD-MCNC: 12.8 G/DL (ref 12–16)
IMM GRANULOCYTES # BLD AUTO: 0.05 X10*3/UL (ref 0–0.7)
IMM GRANULOCYTES NFR BLD AUTO: 0.6 % (ref 0–0.9)
LYMPHOCYTES # BLD AUTO: 3.27 X10*3/UL (ref 1.2–4.8)
LYMPHOCYTES NFR BLD AUTO: 42.1 %
MCH RBC QN AUTO: 31.4 PG (ref 26–34)
MCHC RBC AUTO-ENTMCNC: 33.4 G/DL (ref 32–36)
MCV RBC AUTO: 94 FL (ref 80–100)
MONOCYTES # BLD AUTO: 0.51 X10*3/UL (ref 0.1–1)
MONOCYTES NFR BLD AUTO: 6.6 %
NEUTROPHILS # BLD AUTO: 3.65 X10*3/UL (ref 1.2–7.7)
NEUTROPHILS NFR BLD AUTO: 47 %
NRBC BLD-RTO: 0.3 /100 WBCS (ref 0–0)
P AXIS: 58 DEGREES
P OFFSET: 189 MS
P ONSET: 139 MS
PLATELET # BLD AUTO: 184 X10*3/UL (ref 150–450)
POTASSIUM SERPL-SCNC: 4 MMOL/L (ref 3.5–5.3)
PR INTERVAL: 168 MS
PROT SERPL-MCNC: 6.7 G/DL (ref 6.4–8.2)
Q ONSET: 223 MS
QRS COUNT: 12 BEATS
QRS DURATION: 68 MS
QT INTERVAL: 382 MS
QTC CALCULATION(BAZETT): 409 MS
QTC FREDERICIA: 400 MS
R AXIS: 40 DEGREES
RBC # BLD AUTO: 4.07 X10*6/UL (ref 4–5.2)
SARS-COV-2 RNA RESP QL NAA+PROBE: NOT DETECTED
SODIUM SERPL-SCNC: 140 MMOL/L (ref 136–145)
T AXIS: 65 DEGREES
T OFFSET: 414 MS
VENTRICULAR RATE: 69 BPM
WBC # BLD AUTO: 7.8 X10*3/UL (ref 4.4–11.3)

## 2025-04-23 PROCEDURE — 87636 SARSCOV2 & INF A&B AMP PRB: CPT

## 2025-04-23 PROCEDURE — 93005 ELECTROCARDIOGRAM TRACING: CPT

## 2025-04-23 PROCEDURE — 85025 COMPLETE CBC W/AUTO DIFF WBC: CPT

## 2025-04-23 PROCEDURE — 83880 ASSAY OF NATRIURETIC PEPTIDE: CPT

## 2025-04-23 PROCEDURE — 71046 X-RAY EXAM CHEST 2 VIEWS: CPT

## 2025-04-23 PROCEDURE — 99285 EMERGENCY DEPT VISIT HI MDM: CPT | Mod: 25 | Performed by: EMERGENCY MEDICINE

## 2025-04-23 PROCEDURE — 80053 COMPREHEN METABOLIC PANEL: CPT

## 2025-04-23 PROCEDURE — 71046 X-RAY EXAM CHEST 2 VIEWS: CPT | Performed by: RADIOLOGY

## 2025-04-23 PROCEDURE — 2500000001 HC RX 250 WO HCPCS SELF ADMINISTERED DRUGS (ALT 637 FOR MEDICARE OP)

## 2025-04-23 PROCEDURE — 84484 ASSAY OF TROPONIN QUANT: CPT

## 2025-04-23 PROCEDURE — 94640 AIRWAY INHALATION TREATMENT: CPT

## 2025-04-23 PROCEDURE — 2500000002 HC RX 250 W HCPCS SELF ADMINISTERED DRUGS (ALT 637 FOR MEDICARE OP, ALT 636 FOR OP/ED)

## 2025-04-23 PROCEDURE — 36415 COLL VENOUS BLD VENIPUNCTURE: CPT

## 2025-04-23 PROCEDURE — 87636 SARSCOV2 & INF A&B AMP PRB: CPT | Performed by: EMERGENCY MEDICINE

## 2025-04-23 RX ORDER — PREDNISONE 20 MG/1
60 TABLET ORAL DAILY
Qty: 15 TABLET | Refills: 0 | Status: SHIPPED | OUTPATIENT
Start: 2025-04-23 | End: 2025-04-28

## 2025-04-23 RX ORDER — IPRATROPIUM BROMIDE AND ALBUTEROL SULFATE 2.5; .5 MG/3ML; MG/3ML
3 SOLUTION RESPIRATORY (INHALATION)
Status: DISPENSED | OUTPATIENT
Start: 2025-04-23 | End: 2025-04-23

## 2025-04-23 RX ORDER — GUAIFENESIN 100 MG/5ML
200 LIQUID ORAL ONCE
Status: COMPLETED | OUTPATIENT
Start: 2025-04-23 | End: 2025-04-23

## 2025-04-23 RX ORDER — DOXYCYCLINE 100 MG/1
100 CAPSULE ORAL 2 TIMES DAILY
Qty: 20 CAPSULE | Refills: 0 | Status: SHIPPED | OUTPATIENT
Start: 2025-04-23 | End: 2025-05-03

## 2025-04-23 RX ORDER — FUROSEMIDE 10 MG/ML
40 INJECTION INTRAMUSCULAR; INTRAVENOUS ONCE
Status: DISCONTINUED | OUTPATIENT
Start: 2025-04-23 | End: 2025-04-23

## 2025-04-23 RX ORDER — ACETAMINOPHEN 325 MG/1
975 TABLET ORAL ONCE
Status: COMPLETED | OUTPATIENT
Start: 2025-04-23 | End: 2025-04-23

## 2025-04-23 RX ADMIN — ACETAMINOPHEN 975 MG: 325 TABLET ORAL at 13:47

## 2025-04-23 RX ADMIN — IPRATROPIUM BROMIDE AND ALBUTEROL SULFATE 3 ML: 2.5; .5 SOLUTION RESPIRATORY (INHALATION) at 13:49

## 2025-04-23 RX ADMIN — GUAIFENESIN 200 MG: 200 SOLUTION ORAL at 13:47

## 2025-04-23 RX ADMIN — IPRATROPIUM BROMIDE AND ALBUTEROL SULFATE 3 ML: 2.5; .5 SOLUTION RESPIRATORY (INHALATION) at 13:47

## 2025-04-23 ASSESSMENT — PAIN DESCRIPTION - FREQUENCY: FREQUENCY: INTERMITTENT

## 2025-04-23 ASSESSMENT — PAIN DESCRIPTION - PAIN TYPE: TYPE: ACUTE PAIN

## 2025-04-23 ASSESSMENT — PAIN DESCRIPTION - LOCATION: LOCATION: CHEST

## 2025-04-23 ASSESSMENT — COLUMBIA-SUICIDE SEVERITY RATING SCALE - C-SSRS
6. HAVE YOU EVER DONE ANYTHING, STARTED TO DO ANYTHING, OR PREPARED TO DO ANYTHING TO END YOUR LIFE?: NO
2. HAVE YOU ACTUALLY HAD ANY THOUGHTS OF KILLING YOURSELF?: NO
1. IN THE PAST MONTH, HAVE YOU WISHED YOU WERE DEAD OR WISHED YOU COULD GO TO SLEEP AND NOT WAKE UP?: NO

## 2025-04-23 ASSESSMENT — PAIN SCALES - GENERAL: PAINLEVEL_OUTOF10: 8

## 2025-04-23 ASSESSMENT — PAIN - FUNCTIONAL ASSESSMENT: PAIN_FUNCTIONAL_ASSESSMENT: 0-10

## 2025-04-23 NOTE — ED PROVIDER NOTES
HPI   Chief Complaint   Patient presents with    Flu Symptoms    Chest Pain       64-year-old female past medical history of CHF, asthma, hypertension, type 2 diabetes, hyperlipidemia, paroxysmal SVT presents to the ED today with a chief concern of flulike symptoms.  Patient reports that she has been sick for about 5 days now.  Reports rhinorrhea, nasal congestion, and cough with yellow sputum production.  No fevers.  No nausea or vomiting.  No sore throat.  No body aches.  Up-to-date on immunizations.  She reports some midsternal chest discomfort when coughing.  No recent travel or surgeries.  No history of PE or DVT.  No leg swelling.  Has no other symptoms or concerns at this time.      History provided by:  Patient   used: No            Patient History   Medical History[1]  Surgical History[2]  Family History[3]  Social History[4]    Physical Exam   ED Triage Vitals [04/23/25 1209]   Temperature Heart Rate Respirations BP   36.2 °C (97.1 °F) 68 18 145/61      Pulse Ox Temp Source Heart Rate Source Patient Position   99 % Temporal -- Sitting      BP Location FiO2 (%)     Left arm --       Physical Exam  Gen.: Vitals noted no distress afebrile. Normal phonation, no stridor, no trismus. Patient is handling secretions well without any tripod positioning or drooling.  ENT: TMs clear bilaterally, EACs unremarkable. Mastoids nontender. Posterior oropharynx without erythema, exudate, or swelling. Uvula is in the midline and nonedematous. No Kit's Angina.   Neck: Supple. No meningismus through full range of motion. No lymphadenopathy.   Cardiac: Regular rate rhythm no murmur.   Lungs: Good aeration throughout. No adventitious breath sounds.   Abdomen: Soft nontender nonsurgical throughout  Extremities: No peripheral edema. extremities are moist with good skin turgor.  Skin: No rash.   Neuro: No focal neurologic deficits. cranial nerves II-XII grossly intact.       ED Course & MDM   ED Course as of  04/23/25 1841 Wed Apr 23, 2025   1453 IMPRESSION:  No acute pathologic findings are identified on this exam.  There is no interval change when compared to the previous examination.      MACRO:  none      Signed by: Huan Mott 4/23/2025 2:01 PM  Dictation workstation:   [id number]   []   1531 I personally interpreted the ekg. ventricular rate 89 bpm.  IA interval of 168 ms.  QRS duration 68 ms.  QT/QTc 382/409 ms.  Patient is in normal sinus rhythm at a rate of 69 bpm.  Normal axis.  There is good R wave progression.  No right or left bundle-branch block.  No ST elevations.  Compared with previous EKGs grossly unchanged. []   1531 BNP(!): 201 []      ED Course User Index  [] Mata Cooney PA-C         Diagnoses as of 04/23/25 1841   Bronchitis                 No data recorded     Ryan Coma Scale Score: 15 (04/23/25 1213 : Ami Simmons RN)                           Medical Decision Making  64-year-old female past medical history of CHF, asthma, hypertension, type 2 diabetes, hyperlipidemia, paroxysmal SVT presents to the ED today with a chief concern of flulike symptoms.Patient has full range of motion of the neck without meningismus.  Satting well on room air.  Not hypoxic.  Not tachycardic.  Afebrile.  EKG shows no ischemic changes.  Troponin stable.  Chest x-ray shows no evidence of pneumonia.  Low suspicion for sepsis or bacteremia at this time.  She has normal white blood cell count.  Her BNP was found to be 201.  She was given DuoNebs in the ED and felt better after this.  Was also given Tylenol and Robitussin.  Symptoms could be related to bronchitis versus COPD exacerbation.  MD did give prescription for albuterol, doxycycline, and prednisone.  Discussed impression and findings with patient she feels comfortable returning home.  We discussed very strict precautions including returning for any new or worsening signs or symptoms.  Patient is in agreement with this plan.  She will  follow-up with the PCP within 3 days.  Patient also seen and evaluated by attending physician.    Differential diagnosis: bronchitis, CHF, ACS, COPD, asthma, pneumonia, anaphylaxis, PE, pneumothorax, anxiety, viral versus bacterial URI, arrhythmia    Disposition/treatment  1.  See diagnosis    Shared decision-making was used patient feels comfortable returning home     Patient was advised to follow up with recommended provider in 1 day for another evaluation and exam. I advised patient/guardian to return or go to closest emergency room immediately if symptoms change, get worse, new symptoms develop prior to follow up. If there is no improvement in symptoms in the next 24 hours they are advised to return for further evaluation and exam. I also explained the plan and treatment course. Patient/guardian is in agreement with plan, treatment course, and follow up and states verbally that they will comply.    Patient is homegoing. I discussed the differential; results and discharge plan with the patient and/or family/friend/caregiver if present.  I emphasized the importance of follow-up with the physician I referred them to in the timeframe recommended.  I explained reasons for the patient to return to the Emergency Department. They agreed that if they feel their condition is worsening or if they have any other concern they should call 911 immediately for further assistance. I gave the patient an opportunity to ask all questions they had and answered all of them accordingly. They understand return precautions and discharge instructions. The patient and/or family/friend/caregiver expressed understanding verbally and that they would comply.        This note has been transcribed using voice recognition and may contain grammatical errors, misplaced words, incorrect words, incorrect phrases or other errors.        Procedure  Procedures       [1]   Past Medical History:  Diagnosis Date    Encounter for general adult medical  examination without abnormal findings 11/01/2021    Encounter for annual health examination    Other specified postprocedural states     Status post hernia repair   [2]   Past Surgical History:  Procedure Laterality Date    OTHER SURGICAL HISTORY  08/28/2020    Hernia repair    OTHER SURGICAL HISTORY  08/28/2020    Knee surgery    TUBAL LIGATION  04/16/2015    Tubal Ligation   [3]   Family History  Problem Relation Name Age of Onset    Cancer Mother      Cancer Father     [4]   Social History  Tobacco Use    Smoking status: Every Day     Types: Cigarettes    Smokeless tobacco: Not on file   Substance Use Topics    Alcohol use: Yes    Drug use: Never        Mata Cooney PA-C  04/23/25 0809

## 2025-05-22 ENCOUNTER — OFFICE VISIT (OUTPATIENT)
Dept: CARDIOLOGY | Facility: CLINIC | Age: 65
End: 2025-05-22
Payer: COMMERCIAL

## 2025-05-22 ENCOUNTER — APPOINTMENT (OUTPATIENT)
Dept: CARDIOLOGY | Facility: CLINIC | Age: 65
End: 2025-05-22
Payer: COMMERCIAL

## 2025-05-22 VITALS
HEIGHT: 66 IN | BODY MASS INDEX: 30.22 KG/M2 | HEART RATE: 67 BPM | DIASTOLIC BLOOD PRESSURE: 68 MMHG | SYSTOLIC BLOOD PRESSURE: 130 MMHG | OXYGEN SATURATION: 100 % | RESPIRATION RATE: 20 BRPM | WEIGHT: 188 LBS

## 2025-05-22 DIAGNOSIS — I47.10 PAROXYSMAL SVT (SUPRAVENTRICULAR TACHYCARDIA): ICD-10-CM

## 2025-05-22 DIAGNOSIS — E11.69 TYPE 2 DIABETES MELLITUS WITH OTHER SPECIFIED COMPLICATION, WITHOUT LONG-TERM CURRENT USE OF INSULIN: ICD-10-CM

## 2025-05-22 DIAGNOSIS — G80.8 OTHER CEREBRAL PALSY: ICD-10-CM

## 2025-05-22 DIAGNOSIS — E11.9 TYPE 2 DIABETES MELLITUS WITHOUT COMPLICATION, UNSPECIFIED WHETHER LONG TERM INSULIN USE: ICD-10-CM

## 2025-05-22 DIAGNOSIS — I10 BENIGN ESSENTIAL HYPERTENSION: ICD-10-CM

## 2025-05-22 DIAGNOSIS — R06.09 DOE (DYSPNEA ON EXERTION): ICD-10-CM

## 2025-05-22 DIAGNOSIS — I50.33 ACUTE ON CHRONIC DIASTOLIC CONGESTIVE HEART FAILURE: Primary | ICD-10-CM

## 2025-05-22 DIAGNOSIS — R00.2 PALPITATIONS: ICD-10-CM

## 2025-05-22 DIAGNOSIS — J45.20 MILD INTERMITTENT ASTHMA WITHOUT COMPLICATION (HHS-HCC): ICD-10-CM

## 2025-05-22 PROCEDURE — 99215 OFFICE O/P EST HI 40 MIN: CPT | Performed by: STUDENT IN AN ORGANIZED HEALTH CARE EDUCATION/TRAINING PROGRAM

## 2025-05-22 PROCEDURE — 3078F DIAST BP <80 MM HG: CPT | Performed by: STUDENT IN AN ORGANIZED HEALTH CARE EDUCATION/TRAINING PROGRAM

## 2025-05-22 PROCEDURE — 3075F SYST BP GE 130 - 139MM HG: CPT | Performed by: STUDENT IN AN ORGANIZED HEALTH CARE EDUCATION/TRAINING PROGRAM

## 2025-05-22 PROCEDURE — 3008F BODY MASS INDEX DOCD: CPT | Performed by: STUDENT IN AN ORGANIZED HEALTH CARE EDUCATION/TRAINING PROGRAM

## 2025-05-22 RX ORDER — CLOPIDOGREL BISULFATE 75 MG/1
75 TABLET ORAL DAILY
Qty: 90 TABLET | Refills: 3 | Status: SHIPPED | OUTPATIENT
Start: 2025-05-22 | End: 2026-05-22

## 2025-05-22 RX ORDER — METOPROLOL SUCCINATE 50 MG/1
50 TABLET, EXTENDED RELEASE ORAL DAILY
Qty: 90 TABLET | Refills: 3 | Status: SHIPPED | OUTPATIENT
Start: 2025-05-22 | End: 2026-05-22

## 2025-05-22 RX ORDER — ATORVASTATIN CALCIUM 40 MG/1
40 TABLET, FILM COATED ORAL DAILY
Qty: 90 TABLET | Refills: 3 | Status: SHIPPED | OUTPATIENT
Start: 2025-05-22 | End: 2026-05-22

## 2025-05-22 ASSESSMENT — ENCOUNTER SYMPTOMS: DEPRESSION: 0

## 2025-05-22 NOTE — PATIENT INSTRUCTIONS
We will plan to repeat blood work including comprehensive metabolic panel and BNP level.    Please continue current cardiac medications including atorvastatin 40 mg daily, Plavix 75 mg daily, metoprolol succinate 50 mg daily.  Refills were sent to your pharmacy.    We will obtain a transthoracic echocardiogram for structural evaluation including ejection fraction, assessment of regional wall motion abnormalities or valvular disease, and further evaluation of hemodynamics prior to your follow-up visit    Please followup with me in Cardiology clinic within the next 6 months.  Please return to clinic sooner or seek emergent care if your symptoms reoccur or worsen.

## 2025-05-22 NOTE — PROGRESS NOTES
Follow-up visit    HPI:    Jaimie Joyce is a 64 y.o. female with pertinent history of asthma, prior smoking history, family history of premature CAD, hyperlipidemia, relatively short episodes of paroxysmal supraventricular tachycardia on event monitor performed July 2020, hospitalization for palpitations and dizziness with troponin elevation of 0.27 on 8/20/2020, mild nonobstructive coronary artery disease of the left system with incomplete evaluation of the RCA, elevated LVEDP of 21 to 23 mmHg consistent with acute on chronic diastolic heart failure cardiac catheterization performed 10/8/2020, preserved ejection fraction echo performed 5/10/2023, likely component of acute on chronic diastolic heart failure with BNP elevation of 201 performed 4/23/2025 presents to cardiology clinic for follow up.    She is doing relatively well but is recovering from bronchitis.  She has an ongoing cough.  She was seen in the emergency room back in April and we did note that her BNP level was slightly elevated at that point.  She denies lower extremity edema but does note some increased salt intake.  We did discuss options like diuretics but she would like to defer at this point.  Blood pressure has been well-controlled.  Reviewed discussed her prior echocardiogram and catheterization.  No exacerbating or relieving factors.  Patient denies chest pain and angina.  Pt denies orthopnea, and paroxysmal nocturnal dyspnea.  Pt denies worsening lower extremity edema.  Pt denies palpitations or syncope.  No recent falls.  No fever or chills.  No cough.  No change in bowel or bladder habits.  No sick contacts.  No recent travel.    12 point review of systems including (Constitutional, Eyes, ENMT, Respiratory, Cardiac, Gastrointestinal, Neurological, Psychiatric, and Hematologic) was performed and is otherwise negative.    Past medical history reviewed:   has a past medical history of Encounter for general adult medical examination  without abnormal findings (11/01/2021) and Other specified postprocedural states.    Past surgical history reviewed:   has a past surgical history that includes Tubal ligation (04/16/2015); Other surgical history (08/28/2020); and Other surgical history (08/28/2020).    Social history reviewed:   reports that she has been smoking cigarettes. She does not have any smokeless tobacco history on file. She reports current alcohol use. She reports that she does not use drugs.     Family history reviewed:    Family History   Problem Relation Name Age of Onset    Cancer Mother      Cancer Father         Allergies reviewed: Aspirin, Latex, Penicillins, and Salicylates     Medications reviewed:   Current Outpatient Medications   Medication Instructions    albuterol 90 mcg/actuation inhaler 2 puffs, inhalation, Every 6 hours PRN    albuterol sulfate (Proair Digihaler) 90 mcg/actuation aero powdr breath act w/sensor inhaler 2 puffs, inhalation, Every 4 hours PRN    atorvastatin (LIPITOR) 40 mg, oral, Daily    budesonide-formoteroL (Symbicort) 160-4.5 mcg/actuation inhaler 2 puffs, inhalation, 2 times daily RT, Rinse mouth with water after use to reduce aftertaste and incidence of candidiasis. Do not swallow.    cholecalciferol (VITAMIN D-3) 50 mcg, oral, Daily    clopidogrel (PLAVIX) 75 mg, oral, Daily    diclofenac sodium (VOLTAREN) 4 g, Topical, 4 times daily    flaxseed oiL 1,000 mg, oral, 3 times daily    meloxicam (MOBIC) 7.5 mg, oral, 2 times daily, Take with food.    metFORMIN (GLUCOPHAGE) 500 mg, oral, 2 times daily    metoprolol succinate XL (TOPROL-XL) 50 mg, oral, Daily    omeprazole (PRILOSEC) 40 mg, oral, 2 times daily        Vitals reviewed: There were no vitals taken for this visit.      Physical Exam:   General:  Patient is awake, alert, and oriented.  Patient is in no acute distress.  HEENT:  Pupils equal and reactive.  Normocephalic.  Moist mucosa.    Neck:  No thyromegaly.  Normal Jugular Venous  Pressure.  Cardiovascular:  Regular rate and rhythm.  Normal S1 and S2.  1/6 KEVIN.  Pulmonary:  Clear to auscultation bilaterally.  Abdomen:  Soft. Non-tender.   Non-distended.  Positive bowel sounds.  Lower Extremities:  2+ pedal pulses. No LE edema.  Neurologic:  Cranial nerves intact.  No focal deficit.   Skin: Skin warm and dry, normal skin turgor.   Psychiatric: Normal affect.    Last Labs:  CBC -      Lab Results   Component Value Date    WBC 7.8 04/23/2025    HGB 12.8 04/23/2025    HCT 38.3 04/23/2025     04/23/2025        CMP-  Lab Results   Component Value Date    GLUCOSE 102 (H) 04/23/2025     04/23/2025    K 4.0 04/23/2025     04/23/2025    CO2 27 04/23/2025    ANIONGAP 12 04/23/2025    BUN 18 04/23/2025    CREATININE 0.75 04/23/2025    EGFR 90 04/23/2025    CALCIUM 9.3 04/23/2025    PHOS 4.2 08/21/2020    PROT 6.7 04/23/2025    ALBUMIN 4.2 04/23/2025    AST 19 04/23/2025    ALT 19 04/23/2025    ALKPHOS 56 04/23/2025    BILITOT 0.4 04/23/2025        LIPIDS-  Lab Results   Component Value Date    CHOL 201 (H) 04/19/2024    TRIG 128 04/19/2024    HDL 88.5 04/19/2024    CHHDL 2.3 04/19/2024    VLDL 26 04/19/2024        OTHERS-  Lab Results   Component Value Date    HGBA1C 6.1 (H) 04/19/2024     (H) 04/23/2025        I personally reviewed the patient's recent vitals, labs, medications, orders, EKGs, pertinent cardiac imaging/ echocardiography and ischemic evaluations including stress testing/ cardiac catheterization.    Assessment and Plan:    Jaimie Joyce is a 64 y.o. female with pertinent history of asthma, prior smoking history, family history of premature CAD, hyperlipidemia, relatively short episodes of paroxysmal supraventricular tachycardia on event monitor performed July 2020, hospitalization for palpitations and dizziness with troponin elevation of 0.27 on 8/20/2020, mild nonobstructive coronary artery disease of the left system with incomplete evaluation of the RCA,  elevated LVEDP of 21 to 23 mmHg consistent with acute on chronic diastolic heart failure cardiac catheterization performed 10/8/2020, preserved ejection fraction echo performed 5/10/2023, likely component of acute on chronic diastolic heart failure with BNP elevation of 201 performed 4/23/2025 presents to cardiology clinic for follow up.  She is doing relatively well but is recovering from bronchitis.  She has an ongoing cough.  She was seen in the emergency room back in April and we did note that her BNP level was slightly elevated at that point.  She denies lower extremity edema but does note some increased salt intake.  We did discuss options like diuretics but she would like to defer at this point.  Blood pressure has been well-controlled.  Reviewed discussed her prior echocardiogram and catheterization.     We will plan to repeat blood work including comprehensive metabolic panel and BNP level.    Please continue current cardiac medications including atorvastatin 40 mg daily, Plavix 75 mg daily, metoprolol succinate 50 mg daily.  Refills were sent to your pharmacy.    We will obtain a transthoracic echocardiogram for structural evaluation including ejection fraction, assessment of regional wall motion abnormalities or valvular disease, and further evaluation of hemodynamics prior to your follow-up visit    Please followup with me in Cardiology clinic within the next 6 months.  Please return to clinic sooner or seek emergent care if your symptoms reoccur or worsen.    Thank you for allowing me to participate in their care.  Please feel free to call me with any further questions or concerns.        Judah Sharif MD, FACC, [facility]  Division of Cardiovascular Medicine  System Director, Nuclear Cardiology   Medical Director, [facility] [place], [facility]   Clinical , [facility] [employer] [facility]  July [email or web address]   Office:  [phone]

## 2025-06-05 ENCOUNTER — APPOINTMENT (OUTPATIENT)
Dept: CARDIOLOGY | Facility: CLINIC | Age: 65
End: 2025-06-05
Payer: COMMERCIAL